# Patient Record
Sex: FEMALE | Race: ASIAN | NOT HISPANIC OR LATINO | Employment: STUDENT | URBAN - METROPOLITAN AREA
[De-identification: names, ages, dates, MRNs, and addresses within clinical notes are randomized per-mention and may not be internally consistent; named-entity substitution may affect disease eponyms.]

---
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
## 2021-05-30 VITALS — BODY MASS INDEX: 24.8 KG/M2 | WEIGHT: 123 LBS | HEIGHT: 59 IN

## 2021-05-30 NOTE — PROGRESS NOTES
13-21 YEAR OLD (TEEN) WELL CHILD VISIT    Subjective:    Sandi Huston is a 19 y.o. female who is here for this well-child visit.  History was provided by the patient and father.    No past surgeries.  Family history updated in chart.  Patient and family moved to [place] from [place] about 2.5 years ago.  I requested old records.  I will review records when available and update chart as appropriate.      Current Outpatient Medications:      hydrocortisone 1 % cream, Apply topically 2 (two) times a day., Disp: 60 g, Rfl: 0     triamcinolone (KENALOG) 0.1 % cream, Apply topically 2 (two) times a day., Disp: , Rfl:   Immunization History   Administered Date(s) Administered     DTaP, historic 2000, 2001, 2000, 09/14/2004, 12/29/2006     HPV Quadrivalent 12/19/2013, 07/09/2014, 11/11/2015     Hep A, historic 12/19/2013, 07/09/2014     Hep B, historic 2001, 2001, 03/08/2002     HiB, historic,unspecified 2001, 2001, 03/08/2002, 07/09/2002, 09/14/2004     IPV 2000, 2001, 2001, 12/29/2006     Influenza, inj, historic,unspecified 10/28/2010, 09/22/2011, 10/30/2012, 12/19/2013, 11/04/2014, 10/20/2015     Influenza, seasonal,quad inj 36+ mos 09/30/2018     Influenza,seasonal quad, PF, 36+MOS 11/15/2017     MMR 07/09/2002, 09/21/2004     Meningococcal MCV4 Conjugate,Unspecified 12/19/2013     Meningococcal MCV4P 11/15/2017     Pneumo Conj 13-V (2010&after) 2001, 2001     Pneumo Conj 7-V(before 2010) 2001, 2001     Tdap 01/07/2013, 04/07/2013     Varicella 09/21/2004, 12/29/2006     Past medical, surgical, family history updated as appropriate.    Current concerns: yes - eczema   Last visit with dermatology was on 1/9/2018.  I reviewed consult note today.  She had achieved pretty good results with triamcinolone 0.1% ointment and.  Plan was to continue with these and then follow-up as needed.  Patient notes that these creams, along with  "regular moisturizers, seem to help pretty well.  We reviewed that she could certainly have flareups off and on for the rest of her life.    Currently menstruating? yes -.  Used to be regular every month.  Then he got to be every 3 months.  Now her periods are more irregular.  She thinks her periods are more regular once they moved to [place].  She notes that she may have gained about 20 pounds stopped exercising.  She is unsure if irregular menses runs in her family.  We discussed possibility of doing hormonal work-up and she would like to do this.    Sexually active? no     Review of Nutrition:  Current diet: normal  diet    Sleep Habits: 9:30 pm to 7 am    Social Screening:  Family Unit: mom, dad, MGM, 7 kids  Parental Relations: normal  Sibling relations: 5 brothers, 1 sister  Parental coping and self-care: doing well; no concerns  Concerns regarding behavior with peers? no  Discipline concerns? no  School: [employer] in Fall , Grade: Freshman  School Concerns: None    Secondhand smoke exposure? no  Known TB Exposure? no    Sports/Exercise:  None, encouraged  Social Activities(recreation, hobbies, TV): working in parents restaurant for summer, sings, plays [employer]  Peer Group (friends, dating, sexual activity) : has friends, not dating  Work/Future Plans: major in music and theater    Screening Questions:  Risk factors for anemia: no  Risk factors for vision problems: yes, has glasses  Risk factors for hearing problems: no  Risk factors for sexually-transmitted infections: no  Risk factors for alcohol/drug use:  no      Hearing Screening    Method: Audiometry    125Hz 250Hz 500Hz 1000Hz 2000Hz 3000Hz 4000Hz 6000Hz 8000Hz   Right ear:   Pass Pass Pass  Pass Pass    Left ear:   Pass Pass Pass  Pass Pass       Visual Acuity Screening    Right eye Left eye Both eyes   Without correction:      With correction: 10/10 10/10    Comments: Plus Lens: Blurry      Objective:   Height:  4' 11\" (1.499 m)  Weight: 133 lb 8 " oz (60.6 kg)  Blood Pressure: 104/66  BMI: Body mass index is 26.96 kg/m .    Growth parameters are noted and are appropriate for age.  General:  Alert  Head:  normocephalic  Eyes: PERRL/EOMI  ENT: Ears normal. TMs normal.  Normal oral pharynx.  Neck:  Normal, no masses  Cardiac: Regular without murmur  Pulmonary: Lungs clear bilaterally  Abdomen:  Soft, no masses or organomegaly noted.  Musculoskeletal:  Normal muscle tone and bulk, normal spine  Skin:  No rashes.  Warm and dry.  Neurologic:  Reflexes normal. Gross motor is normal.  Genitalia:  Declined    Assessment and Plan:   1. Well adolescent and normal sports physical.   Anticipatory guidance discussed.  Gave handout on well-child issues at this age.  -Growth and development appropriate for age.  Foods to avoid, wear seat belt, working smoke detectors, gun storage safety, read books, limit t.v./computer/phone exposure, encourage exercise.  Verbal referral given to dentist.  -Immunizations UTD.  Follow-up visit in 1 year for next well child visit, or sooner as needed.  -Referrals: None.    2.  Eczema.  She is seen dermatology in the past.  Last visit 1/9/2018.  She will continue with triamcinolone 0.1% ointment and desonide 0.05% ointment as directed.  We reviewed the general chronic and flare pattern of eczema.  If she would like to see dermatology again, she will let us know.    3.  Irregular menses.  See HPI for discussion.  Pending labs.  Things normal, continue to monitor.  I also recommended 10 to 15 pound weight loss weight to improve chances of periods becoming more regular.  She has no acute concerns today.  Follow-up as needed.

## 2021-05-31 VITALS — WEIGHT: 127 LBS | BODY MASS INDEX: 25.6 KG/M2 | HEIGHT: 59 IN

## 2021-06-02 ENCOUNTER — OFFICE VISIT - HEALTHEAST (OUTPATIENT)
Dept: FAMILY MEDICINE | Facility: CLINIC | Age: 21
End: 2021-06-02

## 2021-06-02 DIAGNOSIS — Z00.00 ANNUAL PHYSICAL EXAM: ICD-10-CM

## 2021-06-02 ASSESSMENT — MIFFLIN-ST. JEOR: SCORE: 1192.73

## 2021-06-03 VITALS
DIASTOLIC BLOOD PRESSURE: 70 MMHG | RESPIRATION RATE: 16 BRPM | HEIGHT: 59 IN | WEIGHT: 134 LBS | SYSTOLIC BLOOD PRESSURE: 98 MMHG | BODY MASS INDEX: 27.01 KG/M2 | HEART RATE: 82 BPM

## 2021-06-03 VITALS — HEIGHT: 59 IN | WEIGHT: 133.5 LBS | BODY MASS INDEX: 26.91 KG/M2

## 2021-06-03 NOTE — PROGRESS NOTES
Travel Advice Consultation  15 minutes spent, > 50% counseling regarding upcoming travel    Dates of upcoming visit: leaving 11/20 for 11/24 weeks  Countries/ region of visit: [place]  Rural regions visited?  Yes  Activities anticipated?  Visit family family  Any recent out of the country travel?  No      Checklist of needs:  Special considerations with medical history?  No  (Zika warning for women of childbearing age, and men due to sexual transmission potential) discussed this, mosquito avoidance, no chance of pregnancy, no plan soon  Due for any routine immunizations?  No  Travel specific immunizations:  -Typhoid?  Yes    Malaria prophylaxis ?  Yes, Malarone/  Traveler's Diarrhea: Yes/azithromycin    Consultation:  Arthropod -borne disease risk reduction  - Insect Repellant with DEET or picardin  -consider permethrin to clothing/ insect nets with permethrin where malaria  -hand washing for traveler's diarrhea  -cation with water  -carry along loperamide for diarrhea/ when to use antibiotic  -motor vehicle accidents are the the most serious cause of death and major injury  -moving legs on long flights to prevent DVTs  -safe sexual practices- 20% have casual sex- 50% unprotected

## 2021-06-03 NOTE — PATIENT INSTRUCTIONS - HE
Motor vehicle crashes are the #1 killer of healthy [place] citizens traveling to foreign countries.    Always wear seat belts and put children in car seats.    Ride only in marked taxis that have seat belts.    Be alert when crossing the street, especially in countries where people drive on the left.    Don't drink and drive.    Expect cars and trucks to share the road with pedestrians, bicycles, rickshaws, and animals.    Don't ride motorcycles. If you must ride a motorcycle, wear a helmet.    Know local traffic laws before you get behind the wheel.    When possible, avoid riding in a car in a developing country at night.    Avoid overcrowded, overweight, or top-heavy buses or vans.  Mosquitos carry many dangerous infections-including Dengue, Malaria and Zika - Use Insect Repellent  Use EPA-registered insect repellents that contain at least 20% DEET (products include [employer] and [employer] [place]) for protection against mosquitoes, ticks, and other bugs. Other repellents protect against mosquitoes but may not be effective against ticks or other bugs:    Picaridin (also known as KBR 3023, Bayrepel, and icaridin)    Oil of lemon eucalyptus (OLE) or para-menthane-diol (PMD)    [id number]    2-undecanone (methyl nonyl ketone)  Avoid Bugs Where You Are Staying  Choose hotel rooms or other accommodations that are air conditioned or have good window and door screens so bugs can t get inside. If bugs can get into where you are sleeping, sleep under a permethrin-treated bed net that can be tucked under the mattress. rin.  Take preventive medication for malaria if you are in an area that has that illness  Sexually Transmitted infections  Travelers who have casual sex are at risk for sexually transmitted diseases. Prevent STDs when you travel overseas.  An estimated 20% of travelers say that they have had casual sex with a new partner while in a foreign country.1 The excitement of being on vacation may encourage people to do  things they would not do at home, and the inhibition-lowering effects of drugs and alcohol can also contribute to this behavior  Some long-distance travelers are at risk for deep vein thrombosis (DVT) and pulmonary embolism (PE). DVT occurs when a blood clot forms in a large vein. Part of a clot may break off and travel to the lungs, causing a PE, which can be fatal.  Protect yourself by knowing your risk and taking steps to prevent  Blood clots (DVT) while on the airplane  Almost anyone can have DVT. People traveling for extended periods of time may be at increased risk for DVT because they have limited movement. The increased risk is usually associated with air travel, but DVT can also form during travel by bus, train, or car.  Most people who develop travel-associated DVT have additional risk factors, including:    A previous blood clot    Family history of blood clots    Known clotting disorder    Recent surgery or injury    Use of estrogen-containing birth control or hormone replacement therapy    Older age    Obesity    Active cancer (or undergoing chemotherapy)    Limited mobility  You can take steps to help prevent DVT. For long distance travelers, these steps include    Getting up occasionally and walking around.    Exercising your calf muscles and stretching your legs while you're sitting. Try these exercises next time you travel:  o Raising and lowering your heels while keeping your toes on the floor.  o Raising and lowering your toes while keeping your heels on the floor.  o Tightening and releasing your leg muscles.    Selecting an aisle seat when possible.  Animal bites- Risk of Rabies Animals can be cute, and you may want to pet them, but any animal, even if it appears to be friendly or harmless, can be dangerous. Any animal can bite, scratch, kick, or otherwise injure you, even if you did nothing to provoke it. Animals are often frightened of humans or trying to protect their territory or their  young, so stay away from all animals. Some diseases can cause an animal to behave aggressively toward people, even if it had previously been friendly. Never try to pet, handle, or feed unfamiliar animals, even pets. (In other countries, pets may not be vaccinated against rabies and other diseases the way they are in the [place].)    The main threat from dogs and cats is rabies. Rabies is spread in the saliva of an infected animal, so the only way to prevent it (other than vaccination) is to avoid being bitten, scratched, or licked by any animal. Although any mammal can get rabies, dogs are responsible for most rabies deaths.  If you are bitten, immediately wash the wound with plenty of soap and water, and see a doctor as soon as possible. Rabies is almost always fatal if an exposed person is not promptly given rabies shots. In some countries, the care needed after a bite may not be available. Medical evacuation insurance would pay to fly you to a country where you can get the best care.  Rabies vaccination is recommended for certain travelers:    People staying a long time in a high-risk area.    People involved in outdoor activities, such as camping or caving, where they might come into contact with animals.    People whose jobs will put them at risk (such as veterinarians or wildlife personnel).    Some children (they tend to play with animals and so are at higher risk).    If you or your children fall into any of these categories, ask your doctor about rabies vaccination. Even if you have been vaccinated against rabies, you must get rabies shots as soon as possible if you are exposed to an animal that might have rabies; having vaccine before traveling will simplify your post-exposure management and may give you more time to seek care. Being vaccinated only buys you more time to get treatment after an exposure.  Diarrhea  Choose foods and beverages carefully to lower your risk of diarrhea Eat only food that  is cooked and served hot. (Avoid, for example, food that has been sitting on a buffet.) Eat raw fruits and vegetables only if you have washed them in clean water or peeled them. Drink only beverages from factory-sealed containers, and avoid ice (because it may have been made from unclean water).    Wash your hands often with soap and water, especially after using the bathroom and before eating. If soap and water aren t available, use an alcohol-based hand . In general, it s a good idea to keep your hands away from your mouth.  People with diarrhea should drink lots of fluids to stay hydrated. This is especially important for young children or adults with chronic illnesses. In serious cases of travelers  diarrhea, oral rehydration solution--available online or in pharmacies in developing countries--can be used for fluid replacement.  Many travelers carry antibiotics with them so they can treat diarrhea early if they start to get sick.  (Ciprofloxacin or Azithromycin; the choice of antibiotics varies depending on the destination. Use this only for severe cases of diarrhea, when you are having more than 4 or 5 episodes per day.  I  Several drugs, such as Lomotil or Imodium, can be bought over-the-counter to treat the symptoms of diarrhea. These drugs decrease the frequency and urgency of needing to use the bathroom, and they may make it easier for a person with diarrhea to ride on a bus or airplane while waiting for an antibiotic to take effect.  People who are traveling to a foreign country to visit friends or relatives (called  VFR travelers  by some professionals) are at higher risk for some diseases. The risk is higher because VFR travelers generally stay longer than tourists, eat local food in people s homes, and may not take the same precautions (such as preventing bug bites) as tourists do. VFR travelers often do not see a doctor for vaccines and advice before they travel, possibly because of cost,  cultural or language barriers, or limited time. If you are planning to travel overseas to visit friends or relatives, consider your increased risk of illnesses and plan accordingly.

## 2021-06-10 NOTE — PROGRESS NOTES
New pt  Rash as skin worse and not responding to usual tmc cream from 2016.    Also something around mouth.  A non prescription.    hosp surg resp issue as 10 month old.  fx left clavicle.  Fmh: dad kidney stone.  Gf gout.  Sister with holes in heart.  Had surgery.  meds tmc cream  Allergies neg  hab neg neg  Fhsh: 10th grade.  Doing all right.   [facility]  [facility].   Lives with family     Five brothers and one sister.  Moved from [facility]    ROS:  Constitutional: denies fever  Vision: denies change in vision  ENT: denies cough  Resp: denies shortness of breath  Card: denies palpitations    No problems with exercise    GI: denies vomiting or abnormal stool, melena, hematochezia  : denies dysuria  Neuro: denies numbness or weakness  Derm: above rash  Joints: denies redness or swelling  Endo: denies polyuria  Mental health: mood is good  Extremities: no edema  Otherwise negative review of systems    ROS: as noted above    OBJECTIVE:   Vitals:    04/28/17 1333   BP: 100/64   Pulse: 84   Resp: 20   Temp: 98.3  F (36.8  C)      Head: atraumatic   Eyes: nl eom, anicteric   Ears: nl external ears   Neck: nl nodes, supple   Lungs: clear to ausc   Heart: regular rhythm  Back: no tenderness  Abd: soft nontender   Joints: uninflamed   Ext: nontender calves   Mental: euthymic  Neuro: no weakness  Gait: normal    Post inflammatory hyperpigmented areas trunk and upper ext  Newer inflamed lesions and excoriated on upper ext. No areas of induration or weeping or abnl nodes  ASSESSMENT/PLAN:    1. Eczema, unspecified type  sulfamethoxazole-trimethoprim (SEPTRA DS) 800-160 mg per tablet    hydrocortisone 1 % cream     Chronic eczema ? Infected and less responsive./ septra ds and continue tmc cream and hc cream. Discussed principles of use.   Expect return to baseline otherwise return

## 2021-06-14 NOTE — PROGRESS NOTES
Still problems with eczema.  Years of creams and flares and many questions.    New issue with servando oral scaliness and hyperpigmentation  Wishes dermatology visit    ROS: as noted above    OBJECTIVE:   Vitals:    11/15/17 1442   BP: (!) 82/60   Pulse: 76   Resp: 20      Eyes: non icteric, noninflamed  Lungs: no resp distress  Heart: regular  Ankles: no edema  Muscles: nontender  Mental status: euthymic  Neuro: nonfocal  Angular stomatitis with post inflammatory pigment  Acne  Mild eczema  ASSESSMENT/PLAN:    1. Eczema  Ambulatory referral to Dermatology   2. Stomatitis  Ambulatory referral to Dermatology   3. Immunization due  Meningococcal MCV4P   4. Acne  Ambulatory referral to Dermatology     Education on likely treatments

## 2021-06-16 PROBLEM — L30.9 ECZEMA: Status: ACTIVE | Noted: 2019-07-08

## 2021-06-16 PROBLEM — N92.6 IRREGULAR MENSES: Status: ACTIVE | Noted: 2019-07-08

## 2021-06-17 NOTE — PATIENT INSTRUCTIONS - HE
Patient Instructions by Latricia Krishnan PA-C at 7/8/2019  3:00 PM     Author: Latricia Krishnan PA-C Service: -- Author Type: Physician Assistant    Filed: 7/8/2019  4:12 PM Encounter Date: 7/8/2019 Status: Signed    : Latricia Krishnan PA-C (Physician Assistant)         Patient Education             [facility] Patient Handout   18 to 21 Year Visits     Your Daily Life    Visit the dentist at least twice a year.    Protect your hearing at work, home, and concerts.    Eat a variety of healthy foods.    Eat breakfast every morning.    Drink plenty of water.    Make sure to get enough calcium.    Have 3 or more servings of low-fat (1%) or fat-free milk and other low-fat dairy products each day.    Aim for 1 hour of vigorous physical activity.    Be proud of yourself when you do something well.  Healthy Behavior Choices    Support friends who choose not to use drugs, alcohol, tobacco, steroids, or diet pills.    If you use drugs or alcohol, you can talk to us about it. We can help you with quitting or cutting down on your use.    Make healthy decisions about your sexual behavior.    If you are sexually active, always practice safe sex. Always use a condom to prevent STIs.    All sexual activity should be something you want. No one should ever force or try to convince you.    Find safe activities at school and in the community. Violence and Injuries    Do not drink and drive or ride in a vehicle with someone who has been using drugs or alcohol.    If you feel unsafe driving or riding with someone, call someone you trust to drive you.    Always wear a seat belt in the car.    Know the rules for safe driving.    Never allow physical harm of yourself or others at home or school.    Always deal with conflict using nonviolence.    Remember that healthy dating relationships are built on respect and that saying no is OK.    Fighting and carrying weapons can be dangerous.  Your Feelings    Figure out  healthy ways to deal with stress.    Try your best to solve problems and make decisions on your own.    Most people have daily ups and downs. But if you are feeling sad, depressed, nervous, irritable, hopeless, or angry, talk with me or another health professional.    We understand sexuality is an important part of your development. If you have any questions or concerns, we are here for you. School and Friends    Take responsibility for being organized enough to succeed in work or school.    Find new activities you enjoy.    Consider volunteering and helping others in the community on an issue that interests or concerns you.    Form healthy friendships and find fun, safe things to do with friends.    As you get older, making and keeping friends is important. You may find that you drift away from some of your old friends--thats normal.    Evaluate your friendships and keep those that are healthy.    It is still important to stay connected with your family.

## 2021-06-19 NOTE — LETTER
Letter by Latricia Krishnan PA-C at      Author: Latricia Krishnan PA-C Service: -- Author Type: --    Filed:  Encounter Date: 7/9/2019 Status: (Other)         formerly [facility] Nhia Robel  [address] [address]             July 9, 2019         Dear Ms. Huston,    Below are the results from your recent visit:    Resulted Orders   Thyroid Stimulating Hormone (TSH)   Result Value Ref Range    TSH 1.98 0.30 - 5.00 uIU/mL   Follicle Stimulating Hormone (FSH)   Result Value Ref Range    FSH 9.0 mIU/mL      Comment:        Females:     Prepubertal     0-10 mIU/mL    Follicular      3-20 mIU/mL    Luteal          0-12 mIU/mL    Ovulatory       9-26 mIU/mL    Postmenopausal   mIU/mL   Prolactin   Result Value Ref Range    Prolactin 10.8 0.0 - 20.0 ng/mL       All of your hormone levels are normal, so they are not affecting your periods.    Please call with questions or contact us using AutoAlertt.    Sincerely,        Electronically signed by Latricia Krishnan PA-C

## 2021-06-25 NOTE — PROGRESS NOTES
FEMALE PREVENTATIVE EXAM    Assessment and Plan:   1. Annual physical exam  Health maintenance discussed with patient as appropriate for age and risk factors.  Encouraged increased exercise and healthy eating.  Verbal referral given for dentist.  Not yet due for pap, follow-up in 1 year.  She declines offer of birth control.  She declines STD screening.  - Pediatric Symptom Checklist      Subjective:   Chief Complaint: Sandi Huston is an 21 y.o. female here for a preventative health visit.    Patient has been advised of split billing requirements and indicates understanding: Yes  HPI:    No concerns today.  Got a message in the mail that she was due for a physical.    Patient was going to [facility].  Is now going to [facility] for cosmetology.    Not using anything for birth control.  All forms of birth control were discussed with patient today.  Printed information given to her on birth control.  Teen screen questionnaire completed today and is negative.    Healthy Habits  Are you taking a daily aspirin? No  Do you typically exercising at least 40 min, 3-4 times per week?  NO  Do you usually eat at least 4 servings of fruit and vegetables a day, include whole grains and fiber and avoid regularly eating high fat foods? NO  Have you had an eye exam in the past two years? Yes  Do you see a dentist twice per year? maybe  Do you have any concerns regarding sleep? No    Safety Screen  If you own firearms, are they secured in a locked gun cabinet or with trigger locks? The patient does not own any firearms  Do you feel you are safe where you are living?: Yes (6/2/2021 10:16 AM)  Do you feel you are safe in your relationship(s)?: Yes (6/2/2021 10:16 AM)      Review of Systems:    Complete review of systems discussed with patient and is negative except as noted in HPI.    Cancer Screening     Patient has no health maintenance due at this time          History     Not marked as reviewed during this visit.        Family  history reviewed and updated today.    Objective:   Vital Signs: There were no vitals taken for this visit.       PHYSICAL EXAM  Vital signs reviewed  General:  Patient is alert and oriented ×3, in no apparent distress  Head:  Normocephalic, without obvious abnormality, atraumatic  Eyes:  PERRLA laterally, conjunctiva clear, EOMs intact bilaterally  Ears:  Normal tympanic membranes and external ear canals  Neck:  No adenopathy, normal ROM  Cardiac:  Regular rate and rhythm, normal S1 and S2, no murmur, rub, or gallop  Lungs:  Clear to auscultation bilaterally, no crackles, rales, rhonchi, or wheezing noted, respirations unlabored  Abdomen:  Soft, nontender, normal bowel sounds, no masses, no organomegaly  Musculoskeletal:  Extremities normal, atraumatic, normal range of motion of all extremities, normal range of motion of back, patient walks a normal tandem gait  Skin:  Skin color, texture, turgor, normal, no rashes or lesions noted on exposed skin  Lymph nodes:  No cervical lymph nodes normal bilaterally  Neurologic:  Cranial nerves II through XII grossly intact      Additional Screenings Completed Today:   None

## 2021-07-06 VITALS
BODY MASS INDEX: 22.98 KG/M2 | DIASTOLIC BLOOD PRESSURE: 72 MMHG | WEIGHT: 114 LBS | OXYGEN SATURATION: 98 % | SYSTOLIC BLOOD PRESSURE: 96 MMHG | HEART RATE: 83 BPM | HEIGHT: 59 IN

## 2021-10-11 ENCOUNTER — HEALTH MAINTENANCE LETTER (OUTPATIENT)
Age: 21
End: 2021-10-11

## 2022-07-17 ENCOUNTER — HEALTH MAINTENANCE LETTER (OUTPATIENT)
Age: 22
End: 2022-07-17

## 2022-09-24 ENCOUNTER — HEALTH MAINTENANCE LETTER (OUTPATIENT)
Age: 22
End: 2022-09-24

## 2023-04-17 ENCOUNTER — OFFICE VISIT (OUTPATIENT)
Dept: FAMILY MEDICINE | Facility: CLINIC | Age: 23
End: 2023-04-17
Payer: COMMERCIAL

## 2023-04-17 ENCOUNTER — ANCILLARY PROCEDURE (OUTPATIENT)
Dept: GENERAL RADIOLOGY | Facility: CLINIC | Age: 23
End: 2023-04-17
Payer: COMMERCIAL

## 2023-04-17 VITALS
SYSTOLIC BLOOD PRESSURE: 117 MMHG | WEIGHT: 127 LBS | BODY MASS INDEX: 24.94 KG/M2 | HEART RATE: 76 BPM | HEIGHT: 60 IN | OXYGEN SATURATION: 99 % | DIASTOLIC BLOOD PRESSURE: 80 MMHG

## 2023-04-17 DIAGNOSIS — Z11.1 SCREENING EXAMINATION FOR PULMONARY TUBERCULOSIS: ICD-10-CM

## 2023-04-17 DIAGNOSIS — Z13.0 SCREENING FOR DEFICIENCY ANEMIA: ICD-10-CM

## 2023-04-17 DIAGNOSIS — Z00.00 ROUTINE GENERAL MEDICAL EXAMINATION AT A HEALTH CARE FACILITY: Primary | ICD-10-CM

## 2023-04-17 DIAGNOSIS — Z71.84 ENCOUNTER FOR COUNSELING FOR TRAVEL: ICD-10-CM

## 2023-04-17 DIAGNOSIS — Z12.4 CERVICAL CANCER SCREENING: ICD-10-CM

## 2023-04-17 DIAGNOSIS — Z11.3 SCREENING FOR STDS (SEXUALLY TRANSMITTED DISEASES): ICD-10-CM

## 2023-04-17 DIAGNOSIS — Z11.4 SCREENING FOR HIV (HUMAN IMMUNODEFICIENCY VIRUS): ICD-10-CM

## 2023-04-17 DIAGNOSIS — T61.91XA ALLERGIC REACTION TO FISH: ICD-10-CM

## 2023-04-17 DIAGNOSIS — Z11.59 NEED FOR HEPATITIS C SCREENING TEST: ICD-10-CM

## 2023-04-17 LAB
ALBUMIN UR-MCNC: NEGATIVE MG/DL
APPEARANCE UR: CLEAR
BILIRUB UR QL STRIP: NEGATIVE
COLOR UR AUTO: YELLOW
CREAT UR-MCNC: 111 MG/DL
GLUCOSE UR STRIP-MCNC: NEGATIVE MG/DL
HGB BLD-MCNC: 13.6 G/DL (ref 11.7–15.7)
HGB UR QL STRIP: NEGATIVE
KETONES UR STRIP-MCNC: NEGATIVE MG/DL
LEUKOCYTE ESTERASE UR QL STRIP: NEGATIVE
MICROALBUMIN UR-MCNC: <12 MG/L
MICROALBUMIN/CREAT UR: NORMAL MG/G{CREAT}
NITRATE UR QL: NEGATIVE
PH UR STRIP: 6.5 [PH] (ref 5–8)
SP GR UR STRIP: 1.02 (ref 1–1.03)
UROBILINOGEN UR STRIP-ACNC: 0.2 E.U./DL

## 2023-04-17 PROCEDURE — 81003 URINALYSIS AUTO W/O SCOPE: CPT

## 2023-04-17 PROCEDURE — 86580 TB INTRADERMAL TEST: CPT

## 2023-04-17 PROCEDURE — 90471 IMMUNIZATION ADMIN: CPT

## 2023-04-17 PROCEDURE — 82570 ASSAY OF URINE CREATININE: CPT

## 2023-04-17 PROCEDURE — 99213 OFFICE O/P EST LOW 20 MIN: CPT | Mod: 25

## 2023-04-17 PROCEDURE — G0145 SCR C/V CYTO,THINLAYER,RESCR: HCPCS

## 2023-04-17 PROCEDURE — 99395 PREV VISIT EST AGE 18-39: CPT | Mod: 25

## 2023-04-17 PROCEDURE — 71046 X-RAY EXAM CHEST 2 VIEWS: CPT | Mod: TC | Performed by: RADIOLOGY

## 2023-04-17 PROCEDURE — 86003 ALLG SPEC IGE CRUDE XTRC EA: CPT

## 2023-04-17 PROCEDURE — 92551 PURE TONE HEARING TEST AIR: CPT

## 2023-04-17 PROCEDURE — 86803 HEPATITIS C AB TEST: CPT

## 2023-04-17 PROCEDURE — 87491 CHLMYD TRACH DNA AMP PROBE: CPT

## 2023-04-17 PROCEDURE — 36415 COLL VENOUS BLD VENIPUNCTURE: CPT

## 2023-04-17 PROCEDURE — 85018 HEMOGLOBIN: CPT

## 2023-04-17 PROCEDURE — 82043 UR ALBUMIN QUANTITATIVE: CPT

## 2023-04-17 PROCEDURE — 87389 HIV-1 AG W/HIV-1&-2 AB AG IA: CPT

## 2023-04-17 PROCEDURE — 90715 TDAP VACCINE 7 YRS/> IM: CPT

## 2023-04-17 PROCEDURE — 87591 N.GONORRHOEAE DNA AMP PROB: CPT

## 2023-04-17 ASSESSMENT — ENCOUNTER SYMPTOMS
EYE PAIN: 0
HEARTBURN: 0
MYALGIAS: 0
DYSURIA: 0
CHILLS: 0
DIARRHEA: 0
FEVER: 0
DIZZINESS: 0
JOINT SWELLING: 0
HEMATOCHEZIA: 0
NERVOUS/ANXIOUS: 0
COUGH: 0
ARTHRALGIAS: 0
HEADACHES: 0
NAUSEA: 0
HEMATURIA: 0
SORE THROAT: 0
CONSTIPATION: 0
ABDOMINAL PAIN: 0
FREQUENCY: 0
PALPITATIONS: 0
BREAST MASS: 0
PARESTHESIAS: 0
WEAKNESS: 0
SHORTNESS OF BREATH: 0

## 2023-04-17 NOTE — ASSESSMENT & PLAN NOTE
Reviewed the documentation the patient provided for upcoming study abroad program.  Hearing and vision was completed today per this request.  Chest x-ray was obtained.  PPD was applied today, and patient will return to clinic in 3 days for reevaluation.  She will also complete a UA at that time, as we did routine STI screening today.  Hemoglobin was normal.  Updated patient's tetanus vaccine today; she is up-to-date on all other recommendations.  I did encourage her to schedule a travel visit to a couple of weeks prior to her leaving the country, she discussed recommended vaccinations or other precautions specific travel to [place].

## 2023-04-17 NOTE — ASSESSMENT & PLAN NOTE
Annual examination.  Patient is at average risk of breast and colon cancer, so screening has not been initiated yet.  Initial Pap was done today after thorough explanation of procedure and monitoring guidelines.  HIV and hepatitis C routine screening labs were completed today.  Additionally, gonorrhea and chlamydia screenings were completed today as is recommended; patient denies any current concerns.  Obtained variety of diagnostics related to patient's upcoming travel to [place], documented elsewhere.  Discussed diet and exercise habits; encourage patient to begin to incorporate regular cardiovascular exercise into her days.  Discussed bone health, including adequate calcium and vitamin D intake with weightbearing exercise.  Follow-up in 1 year or sooner with any acute concerns.

## 2023-04-17 NOTE — ASSESSMENT & PLAN NOTE
Discussed with patient that as she is traveling to [facility] in a couple of months and that she has had new emerging food allergens, it would likely be beneficial to obtain an evaluation.  Offered an allergist referral versus basic allergen panel today in clinic.  Patient has elected to proceed with blood work today, and will refer to allergy if needed.

## 2023-04-18 LAB
C TRACH DNA SPEC QL NAA+PROBE: NEGATIVE
HCV AB SERPL QL IA: NONREACTIVE
HIV 1+2 AB+HIV1 P24 AG SERPL QL IA: NONREACTIVE
N GONORRHOEA DNA SPEC QL NAA+PROBE: NEGATIVE

## 2023-04-19 LAB
ALMOND IGE QN: <0.1 KU(A)/L
BKR LAB AP GYN ADEQUACY: NORMAL
BKR LAB AP GYN INTERPRETATION: NORMAL
BKR LAB AP HPV REFLEX: NO
BKR LAB AP PREVIOUS ABNORMAL: NORMAL
CASHEW NUT IGE QN: <0.1 KU(A)/L
CODFISH IGE QN: 9.39 KU(A)/L
COW MILK IGE QN: 0.3 KU(A)/L
EGG WHITE IGE QN: <0.1 KU(A)/L
HAZELNUT IGE QN: <0.1 KU(A)/L
IGE SERPL-ACNC: 250 KU/L (ref 0–114)
PATH REPORT.COMMENTS IMP SPEC: NORMAL
PATH REPORT.COMMENTS IMP SPEC: NORMAL
PATH REPORT.RELEVANT HX SPEC: NORMAL
PEANUT IGE QN: <0.1 KU(A)/L
SALMON IGE QN: 6.93 KU(A)/L
SCALLOP IGE QN: <0.1 KU(A)/L
SESAME SEED IGE QN: <0.1 KU(A)/L
SHRIMP IGE QN: <0.1 KU(A)/L
SOYBEAN IGE QN: <0.1 KU(A)/L
TUNA IGE QN: 3.55 KU(A)/L
WALNUT IGE QN: <0.1 KU(A)/L
WHEAT IGE QN: <0.1 KU(A)/L

## 2023-04-20 ENCOUNTER — ALLIED HEALTH/NURSE VISIT (OUTPATIENT)
Dept: FAMILY MEDICINE | Facility: CLINIC | Age: 23
End: 2023-04-20
Payer: COMMERCIAL

## 2023-04-20 DIAGNOSIS — Z11.1 ENCOUNTER FOR PPD SKIN TEST READING: Primary | ICD-10-CM

## 2023-04-20 PROCEDURE — 99207 PR NO CHARGE NURSE ONLY: CPT

## 2023-04-20 NOTE — PROGRESS NOTES
Patient is here today for a Mantoux (TST) test results.    Did patient return to clinic 48-72 hours from Mantoux (TST) placement: Yes -     Induration Size? Induration <5mm - Enter results in Enter/Edit Activity. Route results to ordering provider.     Patient needs form signed? Yes. Follow clinic form process.     Patient reports having previously had the BCG Vaccine: No    Does patient need a two step? No

## 2023-06-28 ENCOUNTER — OFFICE VISIT (OUTPATIENT)
Dept: FAMILY MEDICINE | Facility: CLINIC | Age: 23
End: 2023-06-28
Payer: COMMERCIAL

## 2023-06-28 VITALS
DIASTOLIC BLOOD PRESSURE: 70 MMHG | SYSTOLIC BLOOD PRESSURE: 100 MMHG | BODY MASS INDEX: 26.39 KG/M2 | HEART RATE: 90 BPM | TEMPERATURE: 99.1 F | WEIGHT: 134.4 LBS | OXYGEN SATURATION: 97 % | RESPIRATION RATE: 16 BRPM | HEIGHT: 60 IN

## 2023-06-28 DIAGNOSIS — Z71.84 TRAVEL ADVICE ENCOUNTER: Primary | ICD-10-CM

## 2023-06-28 PROCEDURE — 91312 COVID-19 BIVALENT 12+ (PFIZER): CPT | Performed by: NURSE PRACTITIONER

## 2023-06-28 PROCEDURE — 0121A COVID-19 BIVALENT 12+ (PFIZER): CPT | Performed by: NURSE PRACTITIONER

## 2023-06-28 PROCEDURE — 99402 PREV MED CNSL INDIV APPRX 30: CPT | Mod: 25 | Performed by: NURSE PRACTITIONER

## 2023-06-28 ASSESSMENT — PAIN SCALES - GENERAL: PAINLEVEL: NO PAIN (0)

## 2023-06-28 NOTE — PATIENT INSTRUCTIONS
Thank you for visiting the [facility] : [phone]  Today June 28, 2023 you received the    Covid 19 booster bivalent    Follow up vaccine appointments can be made as a NURSE ONLY visit at the Travel Clinic, (BE PREPARED TO WAIT, ) or at designated [facility].    If you are receiving the Rabies vaccines series, it is important that you follow the exact schedule ordered.     Pre-travel     We recommend that you purchase Medical Evacuation Insurance prior to your departure.  Https://wwwnc.cdc.gov/travel/page/insurance    [place] your travel plans with the  Department of [place] through STEP ( Smart Traveler Enrollment Program ) https://step.state.gov.  STEP is a free service to allow U[place] citizens and nationals traveling and living abroad to enroll their trip with the nearest U.S. Embassy or Consulate.    Animal Exposure: Avoid all mammals even if they look healthy.  If there is a bite, scratch or even a lick, wash area immediately with soap and water for 15 minutes and seek medical care within 24 hours for evaluation of Rabies post exposure treatment.  Contact your Medical Evacuation Insurance.    Repiratory illness prevention strategies ( Covid and Influenza ) CDC recommendations:  Consider wearing a mask in crowded or poorly ventilated indoor areas, including on public transportation and in transportation hubs.  Hand washing: frequent, thorough handwashing with soap and water for 20 seconds. Use an alcohol-based hand  with at least 60% alcohol if soap and water are not readily available. Avoid touching face, nose, eyes, mouth unless you have done appropriate hand washing as above.  VACCINES: Staying up to date on COVID-19 vaccines significantly lowers the risk of getting very sick, being hospitalized, or dying from COVID-19. CDC recommends that everyone stay up to date on their COVID-19 vaccines, especially people with weakened immune systems.    Travel  Covid 19 Testing:  updated 12/06/2021  International travelers: Pre-travel:  See country specific Embassy websites or airline websites.    Post travel: CDC recommends getting tested 3-5 days after your trip     Post-travel illness:  Contact your provider or [facility] if you develop a fever, rash, cough, diarrhea or other symptoms for up to 1 year after travel.  Inform your healthcare provider when and where you traveled to.    Please call the [facility] with any questions [phone]  Or send your provider a 'My Chart' note.

## 2023-06-28 NOTE — PROGRESS NOTES
"Nurse Note ( Pre-Travel Consult)      Itinerary:  [facility]      Departure Date: 08/20      Return Date: 12/22      Length of Trip 4 months      Reason for Travel: Study abroad           Urban or rural: both      Accommodations: Student Housing        IMMUNIZATION HISTORY  Have you received any immunizations within the past 4 weeks?  No  Have you ever fainted from having your blood drawn or from an injection?  No  Have you ever had a fever reaction to vaccination?  No  Have you ever had any bad reaction or side effect from any vaccination?  No  Have you ever had hepatitis A or B vaccine?  Yes  Do you live (or work closely) with anyone who has AIDS, an AIDS-like condition, any other immune disorder or who is on chemotherapy for cancer?  No  Do you have a family history of immunodeficiency?  No  Have you received any injection of immune globulin or any blood products during the past 12 months?  No    Patient roomed by Zeke Junior Phuong Huston is a 23 year old female seen today alone for counsultation for international travel.   Patient will be departing in  2 month(s) and  traveling alone  with school group.      Patient itinerary :  will be in the urban and periurban region of [facility]  which risk for [place] Encephalitis. exposure.      Patient's activities will include study abroad.    Patient's country of birth is [place]    Special medical concerns: none  Pre-travel questionnaire was completed by patient and reviewed by provider.     Vitals: /70   Pulse 90   Temp 99.1  F (37.3  C) (Temporal)   Resp 16   Ht 1.519 m (4' 11.8\")   Wt 61 kg (134 lb 6.4 oz)   LMP 06/03/2023 (Exact Date)   SpO2 97%   BMI 26.42 kg/m    BMI= Body mass index is 26.42 kg/m .    EXAM:  General:  Well-nourished, well-developed in no acute distress.  Appears to be stated age, interacts appropriately and expresses understanding of information given to patient.    Current Outpatient Medications   Medication Sig Dispense Refill     " triamcinolone (KENALOG) 0.1 % cream [TRIAMCINOLONE (KENALOG) 0.1 % CREAM] Apply topically 2 (two) times a day. (Patient not taking: Reported on 6/28/2023)       Patient Active Problem List   Diagnosis     Eczema     Irregular menses     Routine general medical examination at a health care facility     Allergic reaction to fish     Encounter for counseling for travel     Allergies   Allergen Reactions     Fish-Derived Products Other (See Comments)     Numbness     Chicken Allergy Other (See Comments)     Numbness         Immunizations discussed include:   Covid 19: Ordered/given today, risks, benefits and side effects reviewed  Hepatitis A:  Up to date  Hepatitis B: Up to date  Influenza: Up to date  Typhoid: Not indicated  Rabies: Not indicated  Yellow Fever: Not indicated  Maori Encephalitis: ALLERGIC TO FISH, has been tested confirmed   Meningococcus: Up to date  Tetanus/Diphtheria: Up to date  Measles/Mumps/Rubella: Up to date  Cholera: Not needed  Polio: Up to date  Pneumococcal: Up to date  Varicella: Up to date  Shingrix: Not indicated  HPV:  Up to date     TB: low risk     Altitude Exposure on this trip: no  Past tolerance to Altitude: na    ASSESSMENT/PLAN:  Sandi Baca was seen today for travel clinic.    Diagnoses and all orders for this visit:    Travel advice encounter  -     COVID-19 BIVALENT 12+ (PFIZER)      I have reviewed general recommendations for safe travel   including: food/water precautions, insect precautions, safer sex   practices given high prevalence of Zika, HIV and other STDs,   roadway safety. Educational materials and [employer] report provided.    Malaraia prophylaxis recommended: none  Symptomatic treatment for traveler's diarrhea: azithromycin        Evacuation insurance advised and resources were provided to patient.    Total visit time 30 minutes  with over 50% of time spent counseling patient and shared decision making as detailed above.    Paz Kent CNP  Certificate in Travel  Health

## 2023-07-28 ENCOUNTER — OFFICE VISIT (OUTPATIENT)
Dept: DERMATOLOGY | Facility: CLINIC | Age: 23
End: 2023-07-28
Payer: COMMERCIAL

## 2023-07-28 DIAGNOSIS — L30.9 ECZEMA, UNSPECIFIED TYPE: ICD-10-CM

## 2023-07-28 DIAGNOSIS — L30.9 DERMATITIS OF LIP: Primary | ICD-10-CM

## 2023-07-28 PROCEDURE — 99204 OFFICE O/P NEW MOD 45 MIN: CPT | Performed by: NURSE PRACTITIONER

## 2023-07-28 RX ORDER — TRIAMCINOLONE ACETONIDE 1 MG/G
OINTMENT TOPICAL 2 TIMES DAILY
Qty: 80 G | Refills: 1 | Status: SHIPPED | OUTPATIENT
Start: 2023-07-28

## 2023-07-28 RX ORDER — HYDROCORTISONE 25 MG/G
OINTMENT TOPICAL 2 TIMES DAILY
Qty: 40 G | Refills: 1 | Status: SHIPPED | OUTPATIENT
Start: 2023-07-28

## 2023-07-28 NOTE — LETTER
7/28/2023         RE: Sandi Huston  [address] [facility] [address]        Dear Colleague,    Thank you for referring your patient, Sandi Huston, to the [facility]. Please see a copy of my visit note below.    [facility] Dermatology Note  Encounter Date: Jul 28, 2023  Office Visit     Reviewed patients past medical history and pertinent chart review prior to patients visit today.     Dermatology Problem List:  Eczematous dermatitis versus irritant/contact dermatitis    ____________________________________________    Assessment & Plan:     # Eczematous dermatitis versus irritant/contact dermatitis  -For the facial dermatitis she will start using hydrocortisone 2.5% ointment twice daily.  She will discontinue all facial products and will wash the face with water only, she will plan to use only Aquaphor ointment or Vaseline as moisturizers for the lips.  I did discuss use of Vanicream moisturizing cream as her facial moisturizer.  I would like her to refrain from using any other products and creams on the hands or face to minimize reaction potential.  -For flares on the hands of the body start triamcinolone 0.1% ointment twice daily for 2-4 weeks, decreasing as patients rash improves, repeat cycle for flares. If not fully resolved in 1 month, patient advised to return to clinic for reevaluation. Discussed risk of skin atrophy with long term chronic use. Not for face, armpits or groin.   -When bathing, use gentle soap such as Dove for Sensitive Skin and lukewarm water on amrpits, groin, and other visibly dirty areas, all other areas let the water run over but no soap is necessary. Pat skin dry instead of vigorous rubbing. Encouraged regular use of an emollient such as Vanicream, Cera Ve Cream or Cetaphil Cream to the entire body.     Follow-up in 3 to 4 weeks, before she leaves to study abroad in [place], for recheck    Faina Yañez, CNP  Dermatology    _______________________________________    CC: Rash (Rash around mouth and palms of hand/finger, neck. Flares at times and today looks better. Had since childhood ( in last year having lots of flares srinivas with warm weather). 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 Sandi Huston is a(n) 23 year old female who presents today as a new patient for rash on the lips and surrounding skin, hands and arms.  She has had eczema since she was a child but mostly in the creases of the elbows knees and face.  She seemed to grow out of this but about 5 years ago she started to get some rashes on her arms and hands and about a year ago she started getting more rash around her lips.  It is itchy red and flaky.  She has been using several over-the-counter facial products and lip products.  Currently she is mostly using A&E ointment, Aquaphor lip balm, and Vaseline but she does use other products on her face such as toners cleansers and moisturizers.    Patient is otherwise feeling well, without additional skin concerns.      Physical Exam:  Vitals: [facility] 06/03/2023 (Exact Date)   SKIN: Focused examination of hands, arms, face, neck was performed.  -Bilateral lips and surrounding skin especially on the upper cutaneous lip is consistent erythema and fine scaling  -A few patches of erythema and scale on the left palm and scattered on bilateral arms as well as some hyperpigmented areas from well-healed lesions    - No other lesions of concern on areas examined.     Medications:  No current outpatient medications on file.     No current facility-administered medications for this visit.      Past Medical History:   Patient Active Problem List   Diagnosis     Eczema     Irregular menses     Routine general medical examination at a health care facility     Allergic reaction to fish      Encounter for counseling for travel     No past medical history on file.    CC Referred Self, MD  No address on file on close of this encounter.       Again, thank you for allowing me to participate in the care of your patient.        Sincerely,        GURMEET Bro CNP

## 2023-07-28 NOTE — PATIENT INSTRUCTIONS
Eczema Action Plan - start by using only vanicream, aquaphor or vaseline products to make sure you are not allergic to something you are putting on your skin.     Name: Sandi Huston Provider: ERMIAS LEE Date: 7/28/2023    Step 1: Eczema Under Control (Skin is soft and flexible, not red or itchy)  Moisturize the whole body at least two times a day.  Watch for signs that the skin is turning red, itchy, or dry.  Use a cream in a jar from the list below. Do not use lotions from a pump.  Suggested creams:  CeraVe Cream, Cetaphil Cream, Vanicream, Aquaphor, Vaseline  Use a gentle cleanser/soap in the bath/shower such as dove sensitive cleanser or Cetaphil cleanser only to the armpits and groin areas, or where you are visibly dirty. All other areas should get washed with water only. Do not scrub. After bathing pat the skin dry with a towel instead of rubbing dry. Apply your moisturizer while you're still slightly damp to lock in some of the moisture.     Step 2:  Eczema Flare (Eczema is out of control and not responding to maintenance care)       Continue above procedures  Also, use prescribed steroid ointment two times a day for up to two weeks per month. Apply to red and itchy areas. Put the steroid on the skin first before other creams.   Face:hydrocortisone 2.5% ointment  Body:triamcinolone 0.1% ointment     Use one fingertip unit of the ointment for eczema. A fingertip unit is the amount of ointment from the first bend in finger to the fingertip. This amount will cover an area equal to two adult hands. Using steroids for extended periods of time can thin the skin and cause stretch marks. Never use for longer than 3-4 weeks before following up with your provider. Please follow up in clinic if rash persists and does not resolve with this regimen in 1 months time.     Moisturize your whole body two times a day with a suggested cream.    Wet Wraps  If your rash is very itchy or getting out of control you can also  "try applying what we call \"wet wraps\". Use your moisturizers/medications where instructed and THEN cover that body area with a wet cotton clothing or cloth and put a second dry layer over the top. You can do this for a short time or even overnight. For example: if your hands are the problem area, try wet cotton gloves or socks and cover with a dry pair. Or if your legs are affected, wet a pair of cotton pajama pants and ring them out then wear them covered with a dry pair overnight.     About Dry Skin    What is dry skin?  Common skin problem  Can be worse during the winter   Affects all ages  Occurs in people with or without other skin problems    What does it look like?  Fine lines in the skin become more visible   Rough feeling skin   Flaky skin  Most common on the arms and legs  Skin can become cracked, especially on the hands and feet    What are some problems caused by dry skin?   Itching  Rubbing or scratching can cause thickened, rough skin patches  Cracks in skin can be painful  Red, itchy, scaly skin (called eczema) can occur  Yellow crusting or pus could be signs of an infection    What causes dry skin?  A lack of water in the top layer of the skin  Too much soapy water,  hot water, or harsh chemicals  Aging and sun damage    How do I treat dry skin?  Shower or bathe daily for under ten minutes with lukewarm water and mild soap.  Pat yourself dry with a towel gently and leave your skin slightly damp.  Use moisturizing cream or ointment right away.  Avoid lotions.    What kind of mild soap should I be using?  Camay , Dove , Tone , Neutrogena , Purpose , or Oil of Olay   A non-detergent cleanser, like Cetaphil , can be used.    What should I stay away from?  Scented soaps   Bath oils    What moisturizers should I be using?  Cetaphil Cream,CeraVe Cream, Vanicream, Eucerin, Aquaphor, or Vaseline   Always apply after showering or bathing.  Reapply throughout the day, if possible.  If dry skin affects your " hands, always reapply after handwashing.    What else should I know?  Using a humidifier during winter months may help.  If dry skin gets worse or if eczema develops, a steroid cream may be needed.

## 2023-07-28 NOTE — PROGRESS NOTES
[facility] Dermatology Note  Encounter Date: Jul 28, 2023  Office Visit     Reviewed patients past medical history and pertinent chart review prior to patients visit today.     Dermatology Problem List:  Eczematous dermatitis versus irritant/contact dermatitis    ____________________________________________    Assessment & Plan:     # Eczematous dermatitis versus irritant/contact dermatitis  -For the facial dermatitis she will start using hydrocortisone 2.5% ointment twice daily.  She will discontinue all facial products and will wash the face with water only, she will plan to use only Aquaphor ointment or Vaseline as moisturizers for the lips.  I did discuss use of Vanicream moisturizing cream as her facial moisturizer.  I would like her to refrain from using any other products and creams on the hands or face to minimize reaction potential.  -For flares on the hands of the body start triamcinolone 0.1% ointment twice daily for 2-4 weeks, decreasing as patients rash improves, repeat cycle for flares. If not fully resolved in 1 month, patient advised to return to clinic for reevaluation. Discussed risk of skin atrophy with long term chronic use. Not for face, armpits or groin.   -When bathing, use gentle soap such as Dove for Sensitive Skin and lukewarm water on amrpits, groin, and other visibly dirty areas, all other areas let the water run over but no soap is necessary. Pat skin dry instead of vigorous rubbing. Encouraged regular use of an emollient such as Vanicream, Cera Ve Cream or Cetaphil Cream to the entire body.     Follow-up in 3 to 4 weeks, before she leaves to study abroad in [place], for recheck    Faina Yañez CNP  Dermatology   _______________________________________    CC: Rash (Rash around mouth and palms of hand/finger, neck. Flares at times and today looks better. Had since childhood ( in last year having lots of flares srinivas with warm weather). 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 Sandi Huston is a(n) 23 year old female who presents today as a new patient for rash on the lips and surrounding skin, hands and arms.  She has had eczema since she was a child but mostly in the creases of the elbows knees and face.  She seemed to grow out of this but about 5 years ago she started to get some rashes on her arms and hands and about a year ago she started getting more rash around her lips.  It is itchy red and flaky.  She has been using several over-the-counter facial products and lip products.  Currently she is mostly using A&E ointment, Aquaphor lip balm, and Vaseline but she does use other products on her face such as toners cleansers and moisturizers.    Patient is otherwise feeling well, without additional skin concerns.      Physical Exam:  Vitals: [facility] 06/03/2023 (Exact Date)   SKIN: Focused examination of hands, arms, face, neck was performed.  -Bilateral lips and surrounding skin especially on the upper cutaneous lip is consistent erythema and fine scaling  -A few patches of erythema and scale on the left palm and scattered on bilateral arms as well as some hyperpigmented areas from well-healed lesions    - No other lesions of concern on areas examined.     Medications:  No current outpatient medications on file.     No current facility-administered medications for this visit.      Past Medical History:   Patient Active Problem List   Diagnosis    Eczema    Irregular menses    Routine general medical examination at a health care facility    Allergic reaction to fish    Encounter for counseling for travel     No past medical history on file.    CC Referred Self, MD  No address on file on close of this encounter.

## 2023-08-16 ENCOUNTER — OFFICE VISIT (OUTPATIENT)
Dept: FAMILY MEDICINE | Facility: CLINIC | Age: 23
End: 2023-08-16
Payer: COMMERCIAL

## 2023-08-16 DIAGNOSIS — L30.9 ECZEMA, UNSPECIFIED TYPE: ICD-10-CM

## 2023-08-16 PROCEDURE — 99213 OFFICE O/P EST LOW 20 MIN: CPT | Performed by: NURSE PRACTITIONER

## 2023-08-16 RX ORDER — TACROLIMUS 1 MG/G
OINTMENT TOPICAL 2 TIMES DAILY
Qty: 60 G | Refills: 1 | Status: SHIPPED | OUTPATIENT
Start: 2023-08-16

## 2023-08-16 RX ORDER — TRIAMCINOLONE ACETONIDE 1 MG/G
CREAM TOPICAL 2 TIMES DAILY
Qty: 80 G | Refills: 1 | Status: SHIPPED | OUTPATIENT
Start: 2023-08-16

## 2023-08-16 ASSESSMENT — PAIN SCALES - GENERAL: PAINLEVEL: NO PAIN (0)

## 2023-08-16 NOTE — LETTER
8/16/2023         RE: Sandi Huston  [address] [facility] [address]        Dear Colleague,    Thank you for referring your patient, Sandi Huston, to the [facility]. Please see a copy of my visit note below.    [facility] Dermatology Note  Encounter Date: Aug 16, 2023  Office Visit     Reviewed patients past medical history and pertinent chart review prior to patients visit today.     Dermatology Problem List:  Eczematous dermatitis versus irritant/contact dermatitis    ____________________________________________    Assessment & Plan:     # Eczematous dermatitis versus irritant/contact dermatitis  -For the facial dermatitis restart hydrocortisone 2.5% ointment twice daily for 2 to 3 weeks.  I would like her to be clear for at least 5 days prior to discontinuation of the hydrocortisone as I think she is stopping the treatment too early and there is still underlying inflammation.  She will discontinue all facial products and will wash the face with water only, she will plan to use only Aquaphor ointment or Vaseline as moisturizers for the lips.  I did discuss use of Vanicream moisturizing cream as her facial moisturizer.  I would like her to refrain from using any other products and creams on the hands or face to minimize reaction potential.  -For flares on the hands of the body start triamcinolone 0.1% ointment twice daily for 2-4 weeks, decreasing as patients rash improves, repeat cycle for flares. If not fully resolved in 1 month, patient advised to return to clinic for reevaluation. Discussed risk of skin atrophy with long term chronic use. Not for face, armpits or groin.   -When bathing, use gentle soap such as Dove for Sensitive Skin and lukewarm water on amrpits, groin, and other visibly dirty areas, all other areas let the water run over but no soap is necessary. Pat skin dry instead of vigorous rubbing. Encouraged regular use of an emollient such as  Vanicream, Cera Ve Cream or Cetaphil Cream to the entire body.   -I gave her a prescription for tacrolimus 0.1% ointment to be used twice daily as needed if she is still struggling with either of these rashes after 1 month of steroid use.  I stressed the importance of not overusing steroids long-term and she will switch to this after 1 month if still flaring.    Follow-up when back in the country after her study abroad if still struggling with rashes    Faina Yañez CNP  Dermatology   _______________________________________    CC: Rash (F/U for rash around mouth, on both arms and hands)    HPI:  Ms. Sandi Huston is a(n) 23 year old female who presents today as a new patient for rash on the lips and surrounding skin, hands and arms.  She has had eczema since she was a child but mostly in the creases of the elbows knees and face.  She seemed to grow out of this but about 5 years ago she started to get some rashes on her arms and hands and about a year ago she started getting more rash around her lips.  It is itchy red and flaky.  She has been using several over-the-counter facial products and lip products.  Currently she is mostly using A&E ointment, Aquaphor lip balm, and Vaseline but she does use other products on her face such as toners cleansers and moisturizers.    Patient is otherwise feeling well, without additional skin concerns.      Physical Exam:  Vitals: [facility] 06/03/2023 (Exact Date)   SKIN: Focused examination of hands, arms, face, neck was performed.  -Bilateral lips and surrounding skin especially on the upper cutaneous lip is mild erythema and hyperpigmentation and fine scaling  -A few 1 cm or less patches of erythema and scale on the left palm and scattered on bilateral arms as well as some hyperpigmented areas from well-healed lesions    - No other lesions of concern on areas examined.     Medications:  Current Outpatient Medications   Medication     hydrocortisone 2.5 % ointment     tacrolimus  (PROTOPIC) 0.1 % external ointment     triamcinolone (KENALOG) 0.1 % external cream     triamcinolone (KENALOG) 0.1 % external ointment     No current facility-administered medications for this visit.      Past Medical History:   Patient Active Problem List   Diagnosis     Eczema     Irregular menses     Routine general medical examination at a health care facility     Allergic reaction to fish     Encounter for counseling for travel     History reviewed. No pertinent past medical history.    CC Referred Self, MD  No address on file on close of this encounter.       Again, thank you for allowing me to participate in the care of your patient.        Sincerely,        GURMEET Bro CNP

## 2023-08-16 NOTE — PROGRESS NOTES
[facility] Dermatology Note  Encounter Date: Aug 16, 2023  Office Visit     Reviewed patients past medical history and pertinent chart review prior to patients visit today.     Dermatology Problem List:  Eczematous dermatitis versus irritant/contact dermatitis    ____________________________________________    Assessment & Plan:     # Eczematous dermatitis versus irritant/contact dermatitis  -For the facial dermatitis restart hydrocortisone 2.5% ointment twice daily for 2 to 3 weeks.  I would like her to be clear for at least 5 days prior to discontinuation of the hydrocortisone as I think she is stopping the treatment too early and there is still underlying inflammation.  She will discontinue all facial products and will wash the face with water only, she will plan to use only Aquaphor ointment or Vaseline as moisturizers for the lips.  I did discuss use of Vanicream moisturizing cream as her facial moisturizer.  I would like her to refrain from using any other products and creams on the hands or face to minimize reaction potential.  -For flares on the hands of the body start triamcinolone 0.1% ointment twice daily for 2-4 weeks, decreasing as patients rash improves, repeat cycle for flares. If not fully resolved in 1 month, patient advised to return to clinic for reevaluation. Discussed risk of skin atrophy with long term chronic use. Not for face, armpits or groin.   -When bathing, use gentle soap such as Dove for Sensitive Skin and lukewarm water on amrpits, groin, and other visibly dirty areas, all other areas let the water run over but no soap is necessary. Pat skin dry instead of vigorous rubbing. Encouraged regular use of an emollient such as Vanicream, Cera Ve Cream or Cetaphil Cream to the entire body.   -I gave her a prescription for tacrolimus 0.1% ointment to be used twice daily as needed if she is still struggling with either of these rashes after 1 month of steroid use.  I stressed  the importance of not overusing steroids long-term and she will switch to this after 1 month if still flaring.    Follow-up when back in the country after her study abroad if still struggling with rashes    Faina Yañez CNP  Dermatology   _______________________________________    CC: Rash (F/U for rash around mouth, on both arms and hands)    HPI:  Ms. Sandi Huston is a(n) 23 year old female who presents today as a new patient for rash on the lips and surrounding skin, hands and arms.  She has had eczema since she was a child but mostly in the creases of the elbows knees and face.  She seemed to grow out of this but about 5 years ago she started to get some rashes on her arms and hands and about a year ago she started getting more rash around her lips.  It is itchy red and flaky.  She has been using several over-the-counter facial products and lip products.  Currently she is mostly using A&E ointment, Aquaphor lip balm, and Vaseline but she does use other products on her face such as toners cleansers and moisturizers.    Patient is otherwise feeling well, without additional skin concerns.      Physical Exam:  Vitals: LMP 06/03/2023 (Exact Date)   SKIN: Focused examination of hands, arms, face, neck was performed.  -Bilateral lips and surrounding skin especially on the upper cutaneous lip is mild erythema and hyperpigmentation and fine scaling  -A few 1 cm or less patches of erythema and scale on the left palm and scattered on bilateral arms as well as some hyperpigmented areas from well-healed lesions    - No other lesions of concern on areas examined.     Medications:  Current Outpatient Medications   Medication    hydrocortisone 2.5 % ointment    tacrolimus (PROTOPIC) 0.1 % external ointment    triamcinolone (KENALOG) 0.1 % external cream    triamcinolone (KENALOG) 0.1 % external ointment     No current facility-administered medications for this visit.      Past Medical History:   Patient Active Problem List    Diagnosis    Eczema    Irregular menses    Routine general medical examination at a health care facility    Allergic reaction to fish    Encounter for counseling for travel     History reviewed. No pertinent past medical history.    CC Referred Self, MD  No address on file on close of this encounter.

## 2024-07-14 ENCOUNTER — HEALTH MAINTENANCE LETTER (OUTPATIENT)
Age: 24
End: 2024-07-14

## 2024-10-12 SDOH — HEALTH STABILITY: PHYSICAL HEALTH: ON AVERAGE, HOW MANY MINUTES DO YOU ENGAGE IN EXERCISE AT THIS LEVEL?: 0 MIN

## 2024-10-12 SDOH — HEALTH STABILITY: PHYSICAL HEALTH: ON AVERAGE, HOW MANY DAYS PER WEEK DO YOU ENGAGE IN MODERATE TO STRENUOUS EXERCISE (LIKE A BRISK WALK)?: 0 DAYS

## 2024-10-12 ASSESSMENT — SOCIAL DETERMINANTS OF HEALTH (SDOH): HOW OFTEN DO YOU GET TOGETHER WITH FRIENDS OR RELATIVES?: ONCE A WEEK

## 2024-10-17 ENCOUNTER — OFFICE VISIT (OUTPATIENT)
Dept: FAMILY MEDICINE | Facility: CLINIC | Age: 24
End: 2024-10-17
Payer: COMMERCIAL

## 2024-10-17 VITALS
SYSTOLIC BLOOD PRESSURE: 119 MMHG | TEMPERATURE: 97.7 F | RESPIRATION RATE: 16 BRPM | HEIGHT: 59 IN | BODY MASS INDEX: 29.23 KG/M2 | WEIGHT: 145 LBS | DIASTOLIC BLOOD PRESSURE: 86 MMHG | HEART RATE: 75 BPM | OXYGEN SATURATION: 100 %

## 2024-10-17 DIAGNOSIS — Z00.00 ANNUAL PHYSICAL EXAM: Primary | ICD-10-CM

## 2024-10-17 DIAGNOSIS — L30.9 ECZEMA, UNSPECIFIED TYPE: ICD-10-CM

## 2024-10-17 DIAGNOSIS — F41.9 ANXIETY: ICD-10-CM

## 2024-10-17 PROBLEM — Z71.84 ENCOUNTER FOR COUNSELING FOR TRAVEL: Status: RESOLVED | Noted: 2023-04-17 | Resolved: 2024-10-17

## 2024-10-17 PROCEDURE — 99213 OFFICE O/P EST LOW 20 MIN: CPT | Mod: 25 | Performed by: PHYSICIAN ASSISTANT

## 2024-10-17 PROCEDURE — 99395 PREV VISIT EST AGE 18-39: CPT | Performed by: PHYSICIAN ASSISTANT

## 2024-10-17 RX ORDER — TRIAMCINOLONE ACETONIDE 1 MG/G
OINTMENT TOPICAL 2 TIMES DAILY
Qty: 80 G | Refills: 1 | Status: SHIPPED | OUTPATIENT
Start: 2024-10-17

## 2024-10-17 ASSESSMENT — PATIENT HEALTH QUESTIONNAIRE - PHQ9
SUM OF ALL RESPONSES TO PHQ QUESTIONS 1-9: 13
10. IF YOU CHECKED OFF ANY PROBLEMS, HOW DIFFICULT HAVE THESE PROBLEMS MADE IT FOR YOU TO DO YOUR WORK, TAKE CARE OF THINGS AT HOME, OR GET ALONG WITH OTHER PEOPLE: VERY DIFFICULT
SUM OF ALL RESPONSES TO PHQ QUESTIONS 1-9: 13

## 2024-10-17 NOTE — PROGRESS NOTES
SUBJECTIVE    Pah Phuong uHston is a 24 year old female who presents for an annual exam.    Other concerns today:  1.  Eczema  She needs refill for triamcinolone.    2.  Irregular periods  She used to get her period every month.  She notes that she has gained a little bit of weight.  Since then she feels like her periods have become more irregular, about every 4 to 5 months.  We discussed lab workup for this or monitoring.  She would like to monitor for now.    3.  Anxiety  She scored high on her PHQ-9 today.  She notes that she is having some anxiety.  Has not had any treatment for this before.      Working at her parents restaurant as a .  Living with her parents.      Patient Active Problem List    Diagnosis Date Noted    Allergic reaction to fish 04/17/2023     Priority: Medium    Eczema 07/08/2019     Priority: Medium    Irregular menses 07/08/2019     Priority: Medium        Immunization History   Administered Date(s) Administered    COVID-19 Bivalent 12+ (Pfizer) 06/28/2023    COVID-19 MONOVALENT 12+ (Pfizer) 03/16/2021, 04/06/2021    COVID-19 Monovalent 12+ (Pfizer 2022) 01/29/2022    DTaP, Unspecified 2001, 2001, 2001, 09/14/2004, 12/29/2006    Flu, Unspecified 10/28/2010, 09/22/2011, 10/30/2012, 12/19/2013, 11/04/2014, 10/20/2015    HIB, Unspecified 2001, 2001, 03/08/2002, 07/09/2002, 09/14/2004    HPV Quadrivalent 12/19/2013, 07/09/2014, 11/11/2015    HepA, Unspecified 12/19/2013, 07/09/2014    HepB, Unspecified 2001, 2001, 03/08/2002    Influenza (H1N1) 12/15/2009, 02/09/2010    Influenza (IIV3) PF 10/28/2010    Influenza Vaccine (Flucelvax Quadrivalent) 09/14/2024    Influenza Vaccine >6 months,quad, PF 11/15/2017, 10/20/2021    Influenza Vaccine, 6+MO IM (QUADRIVALENT W/PRESERVATIVES) 09/30/2018, 10/18/2019, 10/24/2020, 10/23/2022    MMR 07/09/2002, 09/21/2004    Mantoux Tuberculin Skin Test 04/17/2023    Meningococcal ACWY (Menactra ) 11/15/2017     Meningococcal Mcv4 Conjugate,unspecified  12/19/2013    Pneumo Conj 13-V (2010&after) 2001, 2001    Pneumococcal (PCV 7) 2001, 2001, 2001    Poliovirus, inactivated (IPV) 2001, 2001, 2001, 12/29/2006    TDAP (Adacel,Boostrix) 01/07/2013, 04/07/2013, 04/17/2023    Typhoid IM 11/14/2019    Varicella 09/21/2004, 12/29/2006       Patient's last menstrual period was 08/15/2024 (approximate).  OB History   No obstetric history on file.       Current Outpatient Medications   Medication Sig Dispense Refill    hydrocortisone 2.5 % ointment Apply topically 2 times daily To  facial rash rash. Stop when rash/itch resolves. 40 g 1    tacrolimus (PROTOPIC) 0.1 % external ointment Apply topically 2 times daily To rashes as needed 60 g 1    triamcinolone (KENALOG) 0.1 % external cream Apply topically 2 times daily To rash on hands/arms when flared, up to 1 month 80 g 1    triamcinolone (KENALOG) 0.1 % external ointment Apply topically 2 times daily. To rash on hands or body 80 g 1     No current facility-administered medications for this visit.       Past Medical History:   Diagnosis Date    Encounter for counseling for travel 04/17/2023       No past surgical history on file.     Family History   Problem Relation Age of Onset    No Known Problems Mother     No Known Problems Father     Congenital heart disease Sister     No Known Problems Brother     No Known Problems Brother     No Known Problems Brother     No Known Problems Brother     No Known Problems Brother           10/12/2024   General Health   How would you rate your overall physical health? Good   Feel stress (tense, anxious, or unable to sleep) Only a little      (!) STRESS CONCERN      10/12/2024   Nutrition   Three or more servings of calcium each day? (!) NO   Diet: Regular (no restrictions)   How many servings of fruit and vegetables per day? (!) 0-1   How many sweetened beverages each day? 0-1            10/12/2024  "  Exercise   Days per week of moderate/strenous exercise 0 days   Average minutes spent exercising at this level 0 min      (!) EXERCISE CONCERN      10/12/2024   Social Factors   Frequency of gathering with friends or relatives Once a week   Worry food won't last until get money to buy more No   Food not last or not have enough money for food? No   Do you have housing? (Housing is defined as stable permanent housing and does not include staying ouside in a car, in a tent, in an abandoned building, in an overnight shelter, or couch-surfing.) Yes   Are you worried about losing your housing? No   Lack of transportation? No   Unable to get utilities (heat,electricity)? No            10/12/2024   Dental   Dentist two times every year? Yes        Today's PHQ-9 Score:       10/17/2024    11:13 AM   PHQ-9 SCORE   PHQ-9 Total Score MyChart 13 (Moderate depression)   PHQ-9 Total Score 13         10/12/2024   Substance Use   Alcohol more than 3/day or more than 7/wk No   Do you use any other substances recreationally? No        Social History     Tobacco Use    Smoking status: Never     Passive exposure: Never    Smokeless tobacco: Never    Tobacco comments:     no secondhand smoke   Vaping Use    Vaping status: Never Used   Substance Use Topics    Alcohol use: Never    Drug use: Never         10/12/2024   STI Screening   New sexual partner(s) since last STI/HIV test? No            4/17/2023    10:44 AM   PAP / HPV   PAP Negative for Intraepithelial Lesion or Malignancy (NILM)            10/12/2024   Contraception/Family Planning   Questions about contraception or family planning No      Reviewed and updated as needed this visit by Provider        OBJECTIVE    Vitals:    10/17/24 1122   BP: 119/86   BP Location: Left arm   Patient Position: Sitting   Cuff Size: Adult Regular   Pulse: 75   Resp: 16   Temp: 97.7  F (36.5  C)   TempSrc: Oral   SpO2: 100%   Weight: 65.8 kg (145 lb)   Height: 1.51 m (4' 11.45\")       Body mass " index is 28.85 kg/m .    Physical Exam:  General: patient is alert and oriented x 3, in no apparent distress, appropriately groomed with normal affect  HEENT, Thyroid, Lymphatic, Cardiac, Pulmonary, GI, Musculoskeletal, Skin, and Neuro exams were completed today and grossly normal  Breast exam: Deferred  Genitourinary: Deferred      ASSESSMENT and PLAN  1. Annual physical exam  Health maintenance is discussed with patient as appropriate for age and risk factors.  She declines COVID booster shot.  She already got her flu shot this year.  She declines offer of birth control, she declines STD screening.    2. Eczema, unspecified type  Chronic, stable.  Well-controlled with triamcinolone.  Refill sent today.  - triamcinolone (KENALOG) 0.1 % external ointment; Apply topically 2 times daily. To rash on hands or body  Dispense: 80 g; Refill: 1    3. Anxiety  New concern.  She notes that she has had increased anxiety recently.  She is not interested in counseling at this time.  She is not interested in medication.  She will continue to monitor this.  She is encouraged to contact us if anything worsens or if she has questions.  Patient denies any thoughts of wanting to hurt him/herself or others and does contract for safety.      This dictation uses voice recognition software, which may contain typographical errors.

## 2024-10-19 PROBLEM — F41.9 ANXIETY: Status: ACTIVE | Noted: 2024-10-19

## 2025-03-27 ENCOUNTER — OFFICE VISIT (OUTPATIENT)
Dept: DERMATOLOGY | Facility: CLINIC | Age: 25
End: 2025-03-27
Payer: COMMERCIAL

## 2025-03-27 DIAGNOSIS — L30.9 ECZEMA, UNSPECIFIED TYPE: Primary | ICD-10-CM

## 2025-03-27 ASSESSMENT — PAIN SCALES - GENERAL: PAINLEVEL_OUTOF10: MILD PAIN (2)

## 2025-03-27 NOTE — PROGRESS NOTES
[facility] Dermatology Note  Encounter Date: Mar 27, 2025  Office Visit     Reviewed patients past medical history and pertinent chart review prior to patients visit today.     Dermatology Problem List:  # Dermatitis, atopic vs allergic contact  - clobetasol 0.05% ointment, tacrolimus 0.1% ointment, hydrocortisone 2.5% ointment  - Past: triamcinolone 0.1% ointment    ____________________________________________    Assessment & Plan:     # Dermatitis, atopic vs allergic contact  - Chronic, flared  Body:  - For flares: Start clobetasol 0.05% ointment twice daily for 2 weeks. Discussed risk of skin atrophy with long term chronic use. Not for face, armpits or groin.   - For maintenance: Start tacrolimus 0.1% ointment twice daily as needed.     Face:  - For flares: Start hydrocortisone 2.5% ointment twice daily for 7 days. Discussed risk of skin atrophy with long term chronic use.   - For maintenance: Start tacrolimus 0.1% ointment twice daily as needed.     - We discussed the importance of daily emollients. Options include Vanicream, CeraVe Cream, Cetaphil Cream, or Vaseline. Avoid hot water when bathing. Use non-scented soaps and detergents. Pat skin dry instead of vigorous rubbing.     - Wet dressings: Once daily apply a thick layer of medication/ moisturizer to affected areas. Wet cotton pajamas/ long sleeve shirt/ towels with warm water and wring out excess water. Apply the wet cloth to the skin over the medication/ moisturizer for 20- 60 minutes. Remove wet cloth and rub in excess medication/ moisturizer.     Return to clinic in 3 months, sooner PRN for new or worsening condition    All risks, benefits and alternatives were discussed with patient.  Patient is in agreement and understands the assessment and plan.  All questions were answered.  Kristina Howell PA-C  [facility] Dermatology  _______________________________________    CC: Derm Problem and Rash (Rash spreading since  January)    HPI:  Ms. Sandi Huston is a(n) 24 year old female who presents today as a return patient for rash.  She reports a rash on the face and arms that has been flaring for weeks.  The rash is very itchy.  She reports a history of eczema for many years. Past flares resolved with topical steroids and tacrolimus.  She has not applied any topical medications since the most recent flare.  No new products, medications, or illnesses prior to the most recent flare.  She uses nonscented products and applies Vaseline to her skin.  Patient is otherwise feeling well, without additional skin concerns.      Physical Exam:  SKIN: Focused examination of face and arms was performed.  - Pink patch(es) with thin scale involving the upper lip and bilateral arms, consistent with atopic dermatitis.       - No other lesions of concern on areas examined.     Medications:  Current Outpatient Medications   Medication Sig Dispense Refill    hydrocortisone 2.5 % ointment Apply topically 2 times daily To  facial rash rash. Stop when rash/itch resolves. 40 g 1    tacrolimus (PROTOPIC) 0.1 % external ointment Apply topically 2 times daily To rashes as needed 60 g 1    triamcinolone (KENALOG) 0.1 % external cream Apply topically 2 times daily To rash on hands/arms when flared, up to 1 month 80 g 1    triamcinolone (KENALOG) 0.1 % external ointment Apply topically 2 times daily. To rash on hands or body 80 g 1     No current facility-administered medications for this visit.      Past Medical History:   Patient Active Problem List   Diagnosis    Eczema    Irregular menses    Allergic reaction to fish    Anxiety     Past Medical History:   Diagnosis Date    Encounter for counseling for travel 04/17/2023       CC Referred Self, MD  No address on file on close of this encounter.

## 2025-03-27 NOTE — LETTER
3/27/2025      [facility] Phuong Huston  [address] [id number]      Dear Colleague,    Thank you for referring your patient, Sandi Huston, to the [facility]. Please see a copy of my visit note below.    [facility] Dermatology Note  Encounter Date: Mar 27, 2025  Office Visit     Reviewed patients past medical history and pertinent chart review prior to patients visit today.     Dermatology Problem List:  # Dermatitis, atopic vs allergic contact  - clobetasol 0.05% ointment, tacrolimus 0.1% ointment, hydrocortisone 2.5% ointment  - Past: triamcinolone 0.1% ointment    ____________________________________________    Assessment & Plan:     # Dermatitis, atopic vs allergic contact  - Chronic, flared  Body:  - For flares: Start clobetasol 0.05% ointment twice daily for 2 weeks. Discussed risk of skin atrophy with long term chronic use. Not for face, armpits or groin.   - For maintenance: Start tacrolimus 0.1% ointment twice daily as needed.     Face:  - For flares: Start hydrocortisone 2.5% ointment twice daily for 7 days. Discussed risk of skin atrophy with long term chronic use.   - For maintenance: Start tacrolimus 0.1% ointment twice daily as needed.     - We discussed the importance of daily emollients. Options include Vanicream, CeraVe Cream, Cetaphil Cream, or Vaseline. Avoid hot water when bathing. Use non-scented soaps and detergents. Pat skin dry instead of vigorous rubbing.     - Wet dressings: Once daily apply a thick layer of medication/ moisturizer to affected areas. Wet cotton pajamas/ long sleeve shirt/ towels with warm water and wring out excess water. Apply the wet cloth to the skin over the medication/ moisturizer for 20- 60 minutes. Remove wet cloth and rub in excess medication/ moisturizer.     Return to clinic in 3 months, sooner PRN for new or worsening condition    All risks, benefits and alternatives were discussed with patient.  Patient is in  agreement and understands the assessment and plan.  All questions were answered.  Kristina Howell PA-C  [facility] Dermatology  _______________________________________    CC: Derm Problem and Rash (Rash spreading since January)    HPI:  Ms. Sandi Huston is a(n) 24 year old female who presents today as a return patient for rash.  She reports a rash on the face and arms that has been flaring for weeks.  The rash is very itchy.  She reports a history of eczema for many years. Past flares resolved with topical steroids and tacrolimus.  She has not applied any topical medications since the most recent flare.  No new products, medications, or illnesses prior to the most recent flare.  She uses nonscented products and applies Vaseline to her skin.  Patient is otherwise feeling well, without additional skin concerns.      Physical Exam:  SKIN: Focused examination of face and arms was performed.  - Pink patch(es) with thin scale involving the upper lip and bilateral arms, consistent with atopic dermatitis.       - No other lesions of concern on areas examined.     Medications:  Current Outpatient Medications   Medication Sig Dispense Refill     hydrocortisone 2.5 % ointment Apply topically 2 times daily To  facial rash rash. Stop when rash/itch resolves. 40 g 1     tacrolimus (PROTOPIC) 0.1 % external ointment Apply topically 2 times daily To rashes as needed 60 g 1     triamcinolone (KENALOG) 0.1 % external cream Apply topically 2 times daily To rash on hands/arms when flared, up to 1 month 80 g 1     triamcinolone (KENALOG) 0.1 % external ointment Apply topically 2 times daily. To rash on hands or body 80 g 1     No current facility-administered medications for this visit.      Past Medical History:   Patient Active Problem List   Diagnosis     Eczema     Irregular menses     Allergic reaction to fish     Anxiety     Past Medical History:   Diagnosis Date     Encounter for counseling for travel 04/17/2023       CC  Referred Self, MD  No address on file on close of this encounter.       Again, thank you for allowing me to participate in the care of your patient.        Sincerely,        Kristina Howell PA-C    Electronically signed

## 2025-03-27 NOTE — PATIENT INSTRUCTIONS
"Eczema Action Plan    Name: formerly [facility] Phuong Huston Provider: DONA VILLA Date: 3/27/2025    Step 1:  Eczema Flare (Eczema is out of control and not responding to maintenance care)     Continue below procedures  Also, use prescribed steroid two times a day for up to two weeks per month. Apply to red and itchy areas. Put the steroid on the skin first before other creams.   Face: hydrocortisone 2.5% ointment twice daily for 5-7 days as needed  Body: clobetasol 0.05% ointment twice daily for 2 weeks as needed     Moisturize your whole body two times a day with a suggested cream.    Step 2: Eczema Under Control (Skin is soft and flexible, not red or itchy)  For maintenance: tacrolimus 0.1% ointment twice daily     Moisturize the whole body at least two times a day.  Watch for signs that the skin is turning red, itchy, or dry.  Use a cream in a jar from the list below.   Suggested creams:  CeraVe Cream, Cetaphil Cream, Vanicream, Aquaphor, Vaseline  Use a gentle cleanser/soap in the bath/shower such as dove sensitive cleanser or Cetaphil cleanser only to the armpits and groin areas, or where you are visibly dirty. All other areas should get washed with water only. Do not scrub. After bathing pat the skin dry with a towel instead of rubbing dry. Apply your moisturizer while you're still slightly damp to lock in some of the moisture.     Wet Wraps  If your rash is very itchy or getting out of control you can also try applying what we call \"wet wraps\". Apply your moisturizers/medications to involved areas of the skin. Wet cotton clothing/ cloth with warm water and wring out excess water. Cover the involved area of the body with the clothing/ cloth. After 20-60 minutes, remove the clothing/cloth and rub in excess moisturizer/medication.       About Dry Skin    What is dry skin?  Common skin problem  Can be worse during the winter   Affects all ages  Occurs in people with or without other skin problems    What does it look " like?  Fine lines in the skin become more visible   Rough feeling skin   Flaky skin  Most common on the arms and legs  Skin can become cracked, especially on the hands and feet    What are some problems caused by dry skin?   Itching  Rubbing or scratching can cause thickened, rough skin patches  Cracks in skin can be painful  Red, itchy, scaly skin (called eczema) can occur  Yellow crusting or pus could be signs of an infection    What causes dry skin?  A lack of water in the top layer of the skin  Too much soapy water,  hot water, or harsh chemicals  Aging and sun damage    How do I treat dry skin?  Shower or bathe daily for under ten minutes with lukewarm water and mild soap.  Pat yourself dry with a towel gently and leave your skin slightly damp.  Use moisturizing cream or ointment right away.  Avoid lotions.    What kind of mild soap should I be using?  Cetaphil , Dove , Neutrogena , Vanicream , or Oil of Olay     What should I stay away from?  Scented soaps   Bath oils  Exfoliants     What moisturizers should I be using?  Cetaphil Cream,CeraVe Cream, Vanicream, Aquaphor, or Vaseline   Always apply after showering or bathing.  Reapply throughout the day, if possible.  If dry skin affects your hands, always reapply after handwashing.    What else should I know?  Using a humidifier during winter months may help.  If dry skin gets worse or if eczema develops, a steroid cream may be needed.

## 2025-03-28 ENCOUNTER — TELEPHONE (OUTPATIENT)
Dept: DERMATOLOGY | Facility: CLINIC | Age: 25
End: 2025-03-28
Payer: COMMERCIAL

## 2025-03-28 DIAGNOSIS — L30.9 ECZEMA, UNSPECIFIED TYPE: Primary | ICD-10-CM

## 2025-03-28 NOTE — TELEPHONE ENCOUNTER
[facility] Call Center    Phone Message    May a detailed message be left on voicemail: yes     Reason for Call: Medication Question or concern regarding medication   Prescription Clarification  Name of Medication: clobetasol 0.05% ointment, tacrolimus 0.1% ointment, hydrocortisone 2.5% ointment    Prescribing Provider: Kristina GERARD   Pharmacy: [facility] - [place], [place] - [id number]     What on the order needs clarification? Patient did not receive any of the medication refills. Please send Troveboxt message if patient doesn't answer the phone as patient doesn't always get the voicemail's. Thanks.       Action Taken: te sent    Travel Screening: Not Applicable     Date of Service:

## 2025-03-31 RX ORDER — TACROLIMUS 1 MG/G
OINTMENT TOPICAL 2 TIMES DAILY PRN
Qty: 100 G | Refills: 11 | Status: SHIPPED | OUTPATIENT
Start: 2025-03-31

## 2025-03-31 RX ORDER — HYDROCORTISONE 25 MG/G
OINTMENT TOPICAL 2 TIMES DAILY
Qty: 30 G | Refills: 1 | Status: SHIPPED | OUTPATIENT
Start: 2025-03-31

## 2025-03-31 RX ORDER — CLOBETASOL PROPIONATE 0.5 MG/G
OINTMENT TOPICAL 2 TIMES DAILY
Qty: 45 G | Refills: 3 | Status: SHIPPED | OUTPATIENT
Start: 2025-03-31

## 2025-03-31 NOTE — PROGRESS NOTES
ORTHOPAEDIC HAND, WRIST, AND ELBOW OFFICE  VISIT     Name: Patrick Frey      : 1941      MRN: [id number]  Encounter Provider: Radha Maher MD  Encounter Date: 3/31/2025   Encounter department: [facility] [place]  :  Assessment & Plan  Carpal tunnel syndrome, bilateral  Subjective history, clinical exam, and diagnostic studies reviewed with patient  Diagnosis discussed  Treatment options discussed which include nonoperative and operative management.  We discussed use of splinting, therapy, activity modification, use of NSAIDs (oral and topical), and injections.  Discussed use of MSK US in diagnosis of peripheral nerve compression. We discussed risks and benefits of each and well as expected reasonable outcomes.  The patient has had bilateral CTRs.  We discussed the incision and recovery will be longer for recurrent CTS.    Also explained to patient that symptoms may be related to cervical radiculopathy.   The patient was given the opportunity to ask questions.  Questions were answered to the patient's satisfaction.  The patient decided to move forward with US for evaluation of carpal tunnel via shared decision making.  Follow up after Ultrasound. If MSK US are negative, consider spine and pain management referral.  Orders:    US Carpal Tunnel; Future          General Discussions:  Carpal Tunnel Syndrome: The anatomy and physiology of carpal tunnel syndrome was discussed with the patient today.  Increase pressure localized under the transverse carpal ligament can cause pain, numbness, tingling, or dysesthesias within the median nerve distribution as well as feelings of fatigue, clumsiness, or awkwardness.  These symptoms typically occur at night and worse in the morning upon waking.  Eventually, untreated carpal tunnel syndrome can result in weakness and permanent loss of muscle within the thenar compartment of the hand.  Treatment options were discussed with the     SUBJECTIVE:   HPI: Sandi Baca is an 22 year old who presents for preventive health visit.  She is going to be studying abroad in [place] starting this fall, so she is needing a comprehensive visit today including a chest x-ray, PPD test, urinalysis and hemoglobin.  Additionally, she is hoping to discuss allergies.  She states that recently she has developed what seems consistent with an allergic reaction to fish.  She will have numb/itchy gums and lips, which will then swell.  She denies any airway involvement, dysphagia.  He also has similar symptoms, albeit more mild, with chicken.  She is requesting an allergy test today, especially as she will be in a foreign country in a few months.        4/17/2023    10:09 AM   Additional Questions   Roomed by as   Accompanied by self         4/17/2023    10:09 AM   Patient Reported Additional Medications   Patient reports taking the following new medications na   Patient has been advised of split billing requirements and indicates understanding: Yes  Healthy Habits:     Getting at least 3 servings of Calcium per day:  NO    Bi-annual eye exam:  Yes    Dental care twice a year:  NO    Sleep apnea or symptoms of sleep apnea:  None    Diet:  Low salt and Breakfast skipped    Frequency of exercise:  None    Taking medications regularly:  Yes    Medication side effects:  None    PHQ-2 Total Score: 1    Additional concerns today:  No    Works at [employer], which has affected her diet somewhat. Does not regularly exercise.     Today's PHQ-2 Score:       4/17/2023    10:08 AM   PHQ-2 ( 1999 [employer])   Q1: Little interest or pleasure in doing things 1   Q2: Feeling down, depressed or hopeless 0   PHQ-2 Score 1   Q1: Little interest or pleasure in doing things Several days    Several days   Q2: Feeling down, depressed or hopeless Not at all    Not at all   PHQ-2 Score 1    1       Have you ever done Advance Care Planning? (For example, a Health Directive, POLST, or a discussion with  "patient.  Conservative treatment includes nocturnal resting splints to keep the nerve in a neutral position, ergonomic changes within the work or home environment, activity modification, and tendon gliding exercises.  Steroid injections within the carpal canal can help a majority of patients, however this is often self-limited in a most patients.  Surgical intervention to divide the transverse carpal ligament typically results in a long-lasting relief of the patient's complaints, with the recurrence rate of less than 5%.     Operative Discussions:       History of Present Illness   HPI  Chief Complaint   Patient presents with    Left Hand - Tingling, Follow-up    Right Hand - Tingling, Follow-up     Right hand worse       Patrick Frey is a 83 y.o. male who presents today for follow up for bilateral carpal tunnel syndrome. He reports having continued tingling in the fingers of both hands. He had bilateral cortisone injections at his last appointment on 12/13/2024. He reports the injections did provide him with relief.     History of left CTR with Dr. Poole in 2018.  R CTR was done many years ago. Patient believes he had revision R CTR.    Hand dominance: right    REVIEW OF SYSTEMS:  General: no fever, no chills  HEENT:  No loss of hearing or eyesight problems  Eyes:  No red eyes  Respiratory:  No coughing, shortness of breath or wheezing  Cardiovascular:  No chest pain, no palpitations  GI:  Abdomen soft nontender, denies nausea  Endocrine:  No muscle weakness, no frequent urination, no excessive thirst  Urinary:  No dysuria, no incontinence  Musculoskeletal: see HPI and PE  SKIN:  No skin rash, no dry skin  Neurological:  No headaches, no confusion  Psychiatric:  No suicide thoughts, no anxiety, no depression  Review of all other systems is negative    Objective   Ht 5' 8\" (1.727 m)   BMI 22.66 kg/m²      General: well developed and well nourished, alert, oriented times 3, and appears comfortable  Psychiatric: " a medical provider or your loved ones about your wishes): Yes, patient states has an Advance Care Planning document and will bring a copy to the clinic.    Social History     Tobacco Use     Smoking status: Never     Smokeless tobacco: Never     Tobacco comments:     no secondhand smoke   Vaping Use     Vaping status: Not on file   Substance Use Topics     Alcohol use: Not on file           4/17/2023    10:08 AM   Alcohol Use   Prescreen: >3 drinks/day or >7 drinks/week? No     Reviewed orders with patient.  Reviewed health maintenance and updated orders accordingly - Yes  Labs reviewed in EPIC  BP Readings from Last 3 Encounters:   04/17/23 117/80   06/02/21 96/72   11/14/19 98/70    Wt Readings from Last 3 Encounters:   04/17/23 57.6 kg (127 lb)   06/02/21 51.7 kg (114 lb)   11/14/19 60.8 kg (134 lb) (66 %, Z= 0.41)*     * Growth percentiles are based on [facility] (Girls, 2-20 Years) data.                  Patient Active Problem List   Diagnosis     Eczema     Irregular menses     Routine general medical examination at a health care facility     Allergic reaction to fish     Encounter for counseling for travel     History reviewed. No pertinent surgical history.    Social History     Tobacco Use     Smoking status: Never     Smokeless tobacco: Never     Tobacco comments:     no secondhand smoke   Vaping Use     Vaping status: Not on file   Substance Use Topics     Alcohol use: Not on file     Family History   Problem Relation Age of Onset     No Known Problems Mother      No Known Problems Father      Congenital heart disease Sister      No Known Problems Brother      No Known Problems Brother      No Known Problems Brother      No Known Problems Brother      No Known Problems Brother          Current Outpatient Medications   Medication Sig Dispense Refill     triamcinolone (KENALOG) 0.1 % cream [TRIAMCINOLONE (KENALOG) 0.1 % CREAM] Apply topically 2 (two) times a day.       No Known Allergies  Recent Labs   Lab Test  "07/08/19  1623   TSH 1.98        Breast Cancer Screening:        History of abnormal Pap smear: NO - age 21-29 PAP every 3 years recommended      4/17/2023    10:44 AM   PAP / HPV   PAP Negative for Intraepithelial Lesion or Malignancy (NILM)       Reviewed and updated as needed this visit by clinical staff   Tobacco  Allergies  Meds  Problems  Med Hx  Surg Hx  Fam Hx          Reviewed and updated as needed this visit by Provider   Tobacco  Allergies  Meds  Problems  Med Hx  Surg Hx  Fam Hx         Review of Systems   Constitutional: Negative for chills and fever.   HENT: Negative for congestion, ear pain, hearing loss and sore throat.    Eyes: Negative for pain and visual disturbance.   Respiratory: Negative for cough and shortness of breath.    Cardiovascular: Negative for chest pain, palpitations and peripheral edema.   Gastrointestinal: Negative for abdominal pain, constipation, diarrhea, heartburn, hematochezia and nausea.   Breasts:  Negative for tenderness, breast mass and discharge.   Genitourinary: Negative for dysuria, frequency, genital sores, hematuria, pelvic pain, urgency, vaginal bleeding and vaginal discharge.   Musculoskeletal: Negative for arthralgias, joint swelling and myalgias.   Skin: Positive for rash.   Neurological: Negative for dizziness, weakness, headaches and paresthesias.   Psychiatric/Behavioral: Negative for mood changes. The patient is not nervous/anxious.         OBJECTIVE:   /80 (BP Location: Left arm, Patient Position: Left side, Cuff Size: Adult Regular)   Pulse 76   Ht 1.511 m (4' 11.5\")   Wt 57.6 kg (127 lb)   SpO2 99%   BMI 25.22 kg/m       Physical Exam  GENERAL: healthy, alert and no distress  EYES: Eyes grossly normal to inspection, PERRL and conjunctivae and sclerae normal  HENT: ear canals and TM's normal, nose and mouth without ulcers or lesions  NECK: no adenopathy, no asymmetry, masses, or scars and thyroid normal to palpation  RESP: lungs " Normal  HEENT: Trachea Midline, No torticollis  Cardiovascular: No discernable arrhythmia  Pulmonary: No wheezing or stridor  Abdomen: No rebound or guarding  Extremities: No peripheral edema  Skin: No masses, erythema, lacerations, fluctation, ulcerations  Neurovascular: Sensation Intact to the Median, Ulnar, Radial Nerve, Motor Intact to the Median, Ulnar, Radial Nerve, and Pulses Intact    Musculoskeletal exam:  Scar on left cw PSH. No obvious scar on the right. No swelling or ecchymosis.  No deformity.  Hand and fingers were warm and well-perfused.  Capillary refill was brisk.  Full active range of motion of the elbows, forearms, wrists, and fingers.  5/5 elbow flexors/extensors, wrist flexor/extensors, and .      Sensation was not decreased in the median nerve distribution.  Sensation was not decreased in the ulnar nerve distribution.  There was not APB atrophy.  There was no intrinsic atrophy. Tinel's at the wrist was Positive. Tinel's at the elbow was Negative. Wrist flexion compression was positive bilaterally. Evaluation for lacertus syndrome was Negative.     STUDIES REVIEWED:  No Studies to review      PROCEDURES PERFORMED:  Procedures  No Procedures performed today      Scribe Attestation      I,:  Abisai Dorsey am acting as a scribe while in the presence of the attending physician.:       I,:  Radha Maher MD personally performed the services described in this documentation    as scribed in my presence.:            clear to auscultation - no rales, rhonchi or wheezes  BREAST: normal without masses, tenderness or nipple discharge and no palpable axillary masses or adenopathy  CV: regular rate and rhythm, normal S1 S2, no S3 or S4, no murmur, click or rub, no peripheral edema and peripheral pulses strong  ABDOMEN: soft, nontender, no hepatosplenomegaly, no masses and bowel sounds normal   (female): normal female external genitalia, normal urethral meatus, vaginal mucosa pink, moist, well rugated, and normal cervix/adnexa/uterus without masses or discharge  MS: no gross musculoskeletal defects noted, no edema  SKIN: no suspicious lesions or rashes  NEURO: Normal strength and tone, mentation intact and speech normal  PSYCH: mentation appears normal, affect normal/bright    Diagnostic Test Results:  Labs reviewed in Epic    Recent Results (from the past 168 hour(s))   Pap Screen only - recommended age 21 - 24 years   Result Value Ref Range    Interpretation        Negative for Intraepithelial Lesion or Malignancy (NILM)    Comment         Papanicolaou Test Limitations:  Cervical cytology is a screening test with limited sensitivity, and regular screening is critical for cancer prevention.  Pap tests are primarily effective for the diagnosis/prevention of squamous cell carcinoma, not adenocarcinoma or other cancers.        Specimen Adequacy       Satisfactory for evaluation, endocervical/transformation zone component present    Clinical Information       irregular bleeding      Reflex Testing No     Previous Abnormal?       No      Performing Labs       The technical component of this testing was completed at [facility]     HIV Antigen Antibody Combo   Result Value Ref Range    HIV Antigen Antibody Combo Nonreactive Nonreactive   Hepatitis C Screen Reflex to HCV RNA Quant and Genotype   Result Value Ref Range    Hepatitis C Antibody Nonreactive Nonreactive   Allergy  adult food panel   Result Value Ref Range    Immunoglobulin E 250 (H) 0 - 114 kU/L    [place], Food IgE <0.10 <0.10 KU(A)/L    Cashew, Food IgE <0.10 <0.10 KU(A)/L    Codfish IgE 9.39 (H) <0.10 KU(A)/L    Egg White IgE <0.10 <0.10 KU(A)/L    Hazelnut IgE <0.10 <0.10 KU(A)/L    Milk, Cow IgE 0.30 (H) <0.10 KU(A)/L    Peanut IgE <0.10 <0.10 KU(A)/L    [place] IgE 6.93 (H) <0.10 KU(A)/L    Scallop IgE <0.10 <0.10 KU(A)/L    Sesame Seed IgE <0.10 <0.10 KU(A)/L    Shrimp IgE <0.10 <0.10 KU(A)/L    Soybean IgE <0.10 <0.10 KU(A)/L    Tuna IgE 3.55 (H) <0.10 KU(A)/L    [place], Food IgE <0.10 <0.10 KU(A)/L    Wheat IgE <0.10 <0.10 KU(A)/L   Hemoglobin   Result Value Ref Range    Hemoglobin 13.6 11.7 - 15.7 g/dL   NEISSERIA GONORRHOEA PCR    Specimen: Urine, Voided   Result Value Ref Range    Neisseria gonorrhoeae Negative Negative   CHLAMYDIA TRACHOMATIS PCR    Specimen: Urine, Voided   Result Value Ref Range    Chlamydia trachomatis Negative Negative   Urinalysis Macroscopic   Result Value Ref Range    Color Urine Yellow Colorless, Straw, Light Yellow, Yellow    Appearance Urine Clear Clear    Glucose Urine Negative Negative mg/dL    Bilirubin Urine Negative Negative    Ketones Urine Negative Negative mg/dL    Specific Gravity Urine 1.020 1.005 - 1.030    Blood Urine Negative Negative    pH Urine 6.5 5.0 - 8.0    Protein Albumin Urine Negative Negative mg/dL    Urobilinogen Urine 0.2 0.2, 1.0 E.U./dL    Nitrite Urine Negative Negative    Leukocyte Esterase Urine Negative Negative   Albumin Random Urine Quantitative with Creat Ratio   Result Value Ref Range    Creatinine Urine mg/dL 111.0 mg/dL    Albumin Urine mg/L <12.0 mg/L    Albumin Urine mg/g Cr         EXAM: XR CHEST 2 VIEWS  LOCATION: [facility]  DATE/TIME: 4/17/2023 11:20 AM CDT     INDICATION:  Screening examination for pulmonary tuberculosis  COMPARISON: Left clavicle films 05/28/2018                                                                      IMPRESSION: Lungs are clear. Heart and pulmonary vascularity are normal. No signs of acute disease. No signs of TB.    ASSESSMENT/PLAN:     Problem List Items Addressed This Visit        Immune    Allergic reaction to fish     Discussed with patient that as she is traveling to [place] in a couple of months and that she has had new emerging food allergens, it would likely be beneficial to obtain an evaluation.  Offered an allergist referral versus basic allergen panel today in clinic.  Patient has elected to proceed with blood work today, and will refer to allergy if needed.         Relevant Orders    Allergy adult food panel (Completed)       Other    Routine general medical examination at a [facility] care facility - Primary     Annual examination.  Patient is at average risk of breast and colon cancer, so screening has not been initiated yet.  Initial Pap was done today after thorough explanation of procedure and monitoring guidelines.  HIV and hepatitis C routine screening labs were completed today.  Additionally, gonorrhea and chlamydia screenings were completed today as is recommended; patient denies any current concerns.  Obtained variety of diagnostics related to patient's upcoming travel to [facility], documented elsewhere.  Discussed diet and exercise habits; encourage patient to begin to incorporate regular cardiovascular exercise into her days.  Discussed bone health, including adequate calcium and vitamin D intake with weightbearing exercise.  Follow-up in 1 year or sooner with any acute concerns.         Relevant Orders    HEARING SCREENING (Completed)    Encounter for counseling for travel     Reviewed the documentation the patient provided for upcoming study abroad program.  Hearing and vision was completed today per this request.  Chest x-ray was obtained.  PPD was applied today, and patient will return to clinic in 3 days for reevaluation.  She will also complete a UA at that time, as we did routine  "STI screening today.  Hemoglobin was normal.  Updated patient's tetanus vaccine today; she is up-to-date on all other recommendations.  I did encourage her to schedule a travel visit to a couple of weeks prior to her leaving the country, she discussed recommended vaccinations or other precautions specific travel to [place].         Relevant Orders    Hemoglobin (Completed)    HEARING SCREENING (Completed)    Urinalysis Macroscopic (Completed)    Albumin Random Urine Quantitative with Creat Ratio (Completed)   Other Visit Diagnoses     Screening for STDs (sexually transmitted diseases)        Relevant Orders    NEISSERIA GONORRHOEA PCR (Completed)    CHLAMYDIA TRACHOMATIS PCR (Completed)    Screening for HIV (human immunodeficiency virus)        Relevant Orders    HIV Antigen Antibody Combo (Completed)    Need for hepatitis C screening test        Relevant Orders    Hepatitis C Screen Reflex to HCV RNA Quant and Genotype (Completed)    Cervical cancer screening        Relevant Orders    Pap Screen only - recommended age 21 - 24 years (Completed)    Screening examination for pulmonary tuberculosis        Relevant Orders    XR Chest 2 Views (Completed)    Screening for deficiency anemia        Relevant Orders    Hemoglobin (Completed)          COUNSELING:  Reviewed preventive health counseling, as reflected in patient instructions       Regular exercise       Healthy diet/nutrition       Immunizations    Vaccinated for: TDAP         Osteoporosis prevention/bone health    BMI:   Estimated body mass index is 25.22 kg/m  as calculated from the following:    Height as of this encounter: 1.511 m (4' 11.5\").    Weight as of this encounter: 57.6 kg (127 lb).     She reports that she has never smoked. She has never used smokeless tobacco.    Halima Johnson, GURMEET ENCARNACION  [facility]"

## 2025-06-19 ENCOUNTER — OFFICE VISIT (OUTPATIENT)
Dept: DERMATOLOGY | Facility: CLINIC | Age: 25
End: 2025-06-19
Payer: COMMERCIAL

## 2025-06-19 DIAGNOSIS — L30.9 ECZEMA, UNSPECIFIED TYPE: Primary | ICD-10-CM

## 2025-06-19 NOTE — PROGRESS NOTES
[facility] Note  Encounter Date: Jun 19, 2025  Office Visit     Reviewed patient's past medical history and pertinent chart review prior to patient's visit today.     Dermatology Problem List:  # Dermatitis, atopic vs allergic contact  - nbUVB, clobetasol 0.05% ointment, tacrolimus 0.1% ointment, hydrocortisone 2.5% ointment  - patch testing ordered 6/19/2025   - Past: triamcinolone 0.1% ointment    ____________________________________________    Assessment & Plan:     # Dermatitis, atopic vs allergic contact   - Chronic, not at treatment goals.   - Patch testing ordered.   - Discussed risks and benefits of phototherapy versus Dupixent.  The patient elected to start phototherapy.  If her skin demonstrates improvement after 3 months we will plan to apply for a home light unit.  - Continue clobetasol ointment twice daily for 2 weeks as needed for flares on the body, tacrolimus ointment for maintenance.     Follow-up 3 months.    All risks, benefits and alternatives were discussed with patient.  Patient is in agreement and understands the assessment and plan.  All questions were answered.  Kristina Howell PA-C  [facility]  _______________________________________    CC: Eczema (Still having flares on arms to shoulders and back. Hands as well. )    HPI:  Ms. Sandi Huston is a(n) 25 year old female who presents today as a return patient for eczema.  She reports overall her eczema has slightly improved, however, she notes continued new flares on the trunk, face, and arms.  She is diligent with applying topicals and Vaseline. Patient is otherwise feeling well, without additional skin concerns.      Physical Exam:  SKIN: Focused examination of face, back, chest, abdomen, and arms was performed.  The bilateral upper arms, upper back, and right flank demonstrate pink patches with fine scale.  Postinflammatory hyperpigmentation involving the antecubital fossa bilaterally.    -  No other lesions of concern on areas examined.     Medications:  Current Outpatient Medications   Medication Sig Dispense Refill    clobetasol (TEMOVATE) 0.05 % external ointment Apply topically 2 times daily. Apply for 2 weeks Not for face, armpits or groin. 45 g 3    hydrocortisone 2.5 % ointment Apply topically 2 times daily. To face for flares for 14 days 30 g 1    hydrocortisone 2.5 % ointment Apply topically 2 times daily To  facial rash rash. Stop when rash/itch resolves. 40 g 1    tacrolimus (PROTOPIC) 0.1 % external ointment Apply topically 2 times daily as needed (for flares). To face 100 g 11    tacrolimus (PROTOPIC) 0.1 % external ointment Apply topically 2 times daily To rashes as needed 60 g 1    triamcinolone (KENALOG) 0.1 % external cream Apply topically 2 times daily To rash on hands/arms when flared, up to 1 month 80 g 1    triamcinolone (KENALOG) 0.1 % external ointment Apply topically 2 times daily. To rash on hands or body 80 g 1     No current facility-administered medications for this visit.      Past Medical History:   Patient Active Problem List   Diagnosis    Eczema    Irregular menses    Allergic reaction to fish    Anxiety     Past Medical History:   Diagnosis Date    Encounter for counseling for travel 04/17/2023       CC Referred Self, MD  No address on file on close of this encounter.

## 2025-06-19 NOTE — PATIENT INSTRUCTIONS
Proper skin care from [facility]:    -Eliminate harsh soaps as they strip the natural oils from the skin, often resulting in dry itchy skin ( i.e. Dial, Zest, [place])  -Use mild soaps such as Cetaphil or Dove Sensitive Skin in the shower. You do not need to use soap on arms, legs, and trunk every time you shower unless visibly soiled.   -Avoid hot or cold showers.  -After showering, lightly dry off and apply moisturizing within 2-3 minutes. This will help trap moisture in the skin.   -Aggressive use of a moisturizer at least 1-2 times a day to the entire body (including -Vanicream, Cetaphil, Aquaphor or Cerave) and moisturize hands after every washing.  -We recommend using moisturizers that come in a tub that needs to be scooped out, not a pump. This has more of an oil base. It will hold moisture in your skin much better than a water base moisturizer. The above recommended are non-pore clogging.      Wear a sunscreen with at least SPF 30 on your face, ears, neck and V of the chest daily. Wear sunscreen on other areas of the body if those areas are exposed to the sun throughout the day. Sunscreens can contain physical and/or chemical blockers. Physical blockers are less likely to clog pores, these include zinc oxide and titanium dioxide. Reapply every two hour and after swimming.     Sunscreen examples: https://www.ewg.org/sunscreen/    UV radiation  UVA radiation remains constant throughout the day and throughout the year. It is a longer wavelength than UVB and therefore penetrates deeper into the skin leading to immediate and delayed tanning, photoaging, and skin cancer. 70-80% of UVA and UVB radiation occurs between the hours of 10am-2pm.  UVB radiation  UVB radiation causes the most harmful effects and is more significant during the summer months. However, snow and ice can reflect UVB radiation leading to skin damage during the winter months as well. UVB radiation is responsible for tanning,  burning, inflammation, delayed erythema (pinkness), pigmentation (brown spots), and skin cancer.     I recommend self monthly full body exams and yearly full body exams with a dermatology provider. If you develop a new or changing lesion please follow up for examination. Most skin cancers are pink and scaly or pink and pearly. However, we do see blue/brown/black skin cancers.  Consider the ABCDEs of melanoma when giving yourself your monthly full body exam ( don't forget the groin, buttocks, feet, toes, etc). A-asymmetry, B-borders, C-color, D-diameter, E-elevation or evolving. If you see any of these changes please follow up in clinic. If you cannot see your back I recommend purchasing a hand held mirror to use with a larger wall mirror.       Checking for Skin Cancer  You can find cancer early by checking your skin each month. There are 3 kinds of skin cancer. They are melanoma, basal cell carcinoma, and squamous cell carcinoma. Doing monthly skin checks is the best way to find new marks or skin changes. Follow the instructions below for checking your skin.   The ABCDEs of checking moles for melanoma   Check your moles or growths for signs of melanoma using ABCDE:   Asymmetry: the sides of the mole or growth don t match  Border: the edges are ragged, notched, or blurred  Color: the color within the mole or growth varies  Diameter: the mole or growth is larger than 6 mm (size of a pencil eraser)  Evolving: the size, shape, or color of the mole or growth is changing (evolving is not shown in the images below)    Checking for other types of skin cancer  Basal cell carcinoma or squamous cell carcinoma have symptoms such as:     A spot or mole that looks different from all other marks on your skin  Changes in how an area feels, such as itching, tenderness, or pain  Changes in the skin's surface, such as oozing, bleeding, or scaliness  A sore that does not heal  New swelling or redness beyond the border of a  mole    Who s at risk?  Anyone can get skin cancer. But you are at greater risk if you have:   Fair skin, light-colored hair, or light-colored eyes  Many moles or abnormal moles on your skin  A history of sunburns from sunlight or tanning beds  A family history of skin cancer  A history of exposure to radiation or chemicals  A weakened immune system  If you have had skin cancer in the past, you are at risk for recurring skin cancer.   How to check your skin  Do your monthly skin checkups in front of a full-length mirror. Check all parts of your body, including your:   Head (ears, face, neck, and scalp)  Torso (front, back, and sides)  Arms (tops, undersides, upper, and lower armpits)  Hands (palms, backs, and fingers, including under the nails)  Buttocks and genitals  Legs (front, back, and sides)  Feet (tops, soles, toes, including under the nails, and between toes)  If you have a lot of moles, take digital photos of them each month. Make sure to take photos both up close and from a distance. These can help you see if any moles change over time.   Most skin changes are not cancer. But if you see any changes in your skin, call your doctor right away. Only he or she can diagnose a problem. If you have skin cancer, seeing your doctor can be the first step toward getting the treatment that could save your life.   [employer] last reviewed this educational content on 4/1/2019 2000-2020 [employer]. [address] [place] [address], [place] [address]. All rights reserved. This information is not intended as a substitute for professional medical care. Always follow your healthcare professional's instructions.       When should I call my doctor?  If you are worsening or not improving, please, contact us or seek urgent care as noted below.     Who should I call with questions (adults)?    [facility] [phone]  For urgent needs outside of business hours call the [facility] at  [phone] and ask for the dermatology resident on call to be paged  If this is a medical emergency and you are unable to reach an ER, Call [phone]      If you need a prescription refill, please contact your pharmacy. Refills are approved or denied by our Physicians during normal business hours, Monday through Friday.  Per office policy, refills will not be granted if you have not been seen within the past year (or sooner depending on the condition).

## 2025-06-19 NOTE — LETTER
6/19/2025      Sandi Huston  [address] [facility] [address]      Dear Colleague,    Thank you for referring your patient, Sandi Huston, to the [facility]. Please see a copy of my visit note below.    [facility] Note  Encounter Date: Jun 19, 2025  Office Visit     Reviewed patient's past medical history and pertinent chart review prior to patient's visit today.     Dermatology Problem List:  # Dermatitis, atopic vs allergic contact  - nbUVB, clobetasol 0.05% ointment, tacrolimus 0.1% ointment, hydrocortisone 2.5% ointment  - patch testing ordered 6/19/2025   - Past: triamcinolone 0.1% ointment    ____________________________________________    Assessment & Plan:     # Dermatitis, atopic vs allergic contact   - Chronic, not at treatment goals.   - Patch testing ordered.   - Discussed risks and benefits of phototherapy versus Dupixent.  The patient elected to start phototherapy.  If her skin demonstrates improvement after 3 months we will plan to apply for a home light unit.  - Continue clobetasol ointment twice daily for 2 weeks as needed for flares on the body, tacrolimus ointment for maintenance.     Follow-up 3 months.    All risks, benefits and alternatives were discussed with patient.  Patient is in agreement and understands the assessment and plan.  All questions were answered.  Kristina Howell PA-C  [facility]  _______________________________________    CC: Eczema (Still having flares on arms to shoulders and back. Hands as well. )    HPI:  Ms. Sandi Huston is a(n) 25 year old female who presents today as a return patient for eczema.  She reports overall her eczema has slightly improved, however, she notes continued new flares on the trunk, face, and arms.  She is diligent with applying topicals and Vaseline. Patient is otherwise feeling well, without additional skin concerns.      Physical Exam:  SKIN: Focused examination of  face, back, chest, abdomen, and arms was performed.  The bilateral upper arms, upper back, and right flank demonstrate pink patches with fine scale.  Postinflammatory hyperpigmentation involving the antecubital fossa bilaterally.    - No other lesions of concern on areas examined.     Medications:  Current Outpatient Medications   Medication Sig Dispense Refill     clobetasol (TEMOVATE) 0.05 % external ointment Apply topically 2 times daily. Apply for 2 weeks Not for face, armpits or groin. 45 g 3     hydrocortisone 2.5 % ointment Apply topically 2 times daily. To face for flares for 14 days 30 g 1     hydrocortisone 2.5 % ointment Apply topically 2 times daily To  facial rash rash. Stop when rash/itch resolves. 40 g 1     tacrolimus (PROTOPIC) 0.1 % external ointment Apply topically 2 times daily as needed (for flares). To face 100 g 11     tacrolimus (PROTOPIC) 0.1 % external ointment Apply topically 2 times daily To rashes as needed 60 g 1     triamcinolone (KENALOG) 0.1 % external cream Apply topically 2 times daily To rash on hands/arms when flared, up to 1 month 80 g 1     triamcinolone (KENALOG) 0.1 % external ointment Apply topically 2 times daily. To rash on hands or body 80 g 1     No current facility-administered medications for this visit.      Past Medical History:   Patient Active Problem List   Diagnosis     Eczema     Irregular menses     Allergic reaction to fish     Anxiety     Past Medical History:   Diagnosis Date     Encounter for counseling for travel 04/17/2023       CC Referred Self, MD  No address on file on close of this encounter.     Again, thank you for allowing me to participate in the care of your patient.        Sincerely,        Kristina Howell PA-C    Electronically signed

## 2025-06-23 ENCOUNTER — PATIENT OUTREACH (OUTPATIENT)
Dept: CARE COORDINATION | Facility: CLINIC | Age: 25
End: 2025-06-23
Payer: COMMERCIAL

## 2025-06-24 ENCOUNTER — TELEPHONE (OUTPATIENT)
Dept: FAMILY MEDICINE | Facility: CLINIC | Age: 25
End: 2025-06-24
Payer: COMMERCIAL

## 2025-06-24 NOTE — TELEPHONE ENCOUNTER
Contacts       Contact Date/Time Type Contact Phone/Fax    06/24/2025 09:05 AM CDT Phone (Outgoing) Sandi Hustonia (Self) [phone] (H)    Talked with Patient           Attempted to reach patient to: Relay a message    Regarding: called for appointment reminder, task completed.

## 2025-06-25 SDOH — HEALTH STABILITY: PHYSICAL HEALTH: ON AVERAGE, HOW MANY MINUTES DO YOU ENGAGE IN EXERCISE AT THIS LEVEL?: 30 MIN

## 2025-06-25 SDOH — HEALTH STABILITY: PHYSICAL HEALTH: ON AVERAGE, HOW MANY DAYS PER WEEK DO YOU ENGAGE IN MODERATE TO STRENUOUS EXERCISE (LIKE A BRISK WALK)?: 2 DAYS

## 2025-06-25 ASSESSMENT — SOCIAL DETERMINANTS OF HEALTH (SDOH): HOW OFTEN DO YOU GET TOGETHER WITH FRIENDS OR RELATIVES?: TWICE A WEEK

## 2025-06-26 ENCOUNTER — RESULTS FOLLOW-UP (OUTPATIENT)
Dept: FAMILY MEDICINE | Facility: CLINIC | Age: 25
End: 2025-06-26

## 2025-06-26 ENCOUNTER — OFFICE VISIT (OUTPATIENT)
Dept: FAMILY MEDICINE | Facility: CLINIC | Age: 25
End: 2025-06-26
Payer: COMMERCIAL

## 2025-06-26 VITALS
SYSTOLIC BLOOD PRESSURE: 104 MMHG | HEIGHT: 60 IN | RESPIRATION RATE: 16 BRPM | HEART RATE: 68 BPM | TEMPERATURE: 96.8 F | OXYGEN SATURATION: 99 % | DIASTOLIC BLOOD PRESSURE: 76 MMHG | WEIGHT: 155 LBS | BODY MASS INDEX: 30.43 KG/M2

## 2025-06-26 DIAGNOSIS — Z00.00 ANNUAL PHYSICAL EXAM: Primary | ICD-10-CM

## 2025-06-26 DIAGNOSIS — N92.6 IRREGULAR MENSES: ICD-10-CM

## 2025-06-26 DIAGNOSIS — L30.9 ECZEMA, UNSPECIFIED TYPE: ICD-10-CM

## 2025-06-26 DIAGNOSIS — R10.12 LUQ ABDOMINAL PAIN: ICD-10-CM

## 2025-06-26 LAB
ALBUMIN SERPL BCG-MCNC: 4 G/DL (ref 3.5–5.2)
ALP SERPL-CCNC: 70 U/L (ref 40–150)
ALT SERPL W P-5'-P-CCNC: 22 U/L (ref 0–50)
ANION GAP SERPL CALCULATED.3IONS-SCNC: 9 MMOL/L (ref 7–15)
AST SERPL W P-5'-P-CCNC: 28 U/L (ref 0–45)
BILIRUB SERPL-MCNC: 0.3 MG/DL
BUN SERPL-MCNC: 10.7 MG/DL (ref 6–20)
CALCIUM SERPL-MCNC: 9 MG/DL (ref 8.8–10.4)
CHLORIDE SERPL-SCNC: 104 MMOL/L (ref 98–107)
CHOLEST SERPL-MCNC: 203 MG/DL
CREAT SERPL-MCNC: 0.6 MG/DL (ref 0.51–0.95)
EGFRCR SERPLBLD CKD-EPI 2021: >90 ML/MIN/1.73M2
ERYTHROCYTE [DISTWIDTH] IN BLOOD BY AUTOMATED COUNT: 11.9 % (ref 10–15)
EST. AVERAGE GLUCOSE BLD GHB EST-MCNC: 114 MG/DL
FASTING STATUS PATIENT QL REPORTED: NO
FASTING STATUS PATIENT QL REPORTED: NO
GLUCOSE SERPL-MCNC: 85 MG/DL (ref 70–99)
HBA1C MFR BLD: 5.6 % (ref 0–5.6)
HCO3 SERPL-SCNC: 24 MMOL/L (ref 22–29)
HCT VFR BLD AUTO: 39 % (ref 35–47)
HDLC SERPL-MCNC: 40 MG/DL
HGB BLD-MCNC: 12.7 G/DL (ref 11.7–15.7)
LDLC SERPL CALC-MCNC: 139 MG/DL
LIPASE SERPL-CCNC: 42 U/L (ref 13–60)
MCH RBC QN AUTO: 29.1 PG (ref 26.5–33)
MCHC RBC AUTO-ENTMCNC: 32.6 G/DL (ref 31.5–36.5)
MCV RBC AUTO: 89 FL (ref 78–100)
NONHDLC SERPL-MCNC: 163 MG/DL
PLATELET # BLD AUTO: 348 10E3/UL (ref 150–450)
POTASSIUM SERPL-SCNC: 4 MMOL/L (ref 3.4–5.3)
PROT SERPL-MCNC: 7.8 G/DL (ref 6.4–8.3)
RBC # BLD AUTO: 4.36 10E6/UL (ref 3.8–5.2)
SODIUM SERPL-SCNC: 137 MMOL/L (ref 135–145)
TRIGL SERPL-MCNC: 121 MG/DL
WBC # BLD AUTO: 7.3 10E3/UL (ref 4–11)

## 2025-06-26 NOTE — PROGRESS NOTES
Prior to immunization administration, verified patients identity using patient s name and date of birth. Please see Immunization Activity for additional information.     Screening Questionnaire for Adult Immunization    Are you sick today?   No   Do you have allergies to medications, food, a vaccine component or latex?   Yes   Have you ever had a serious reaction after receiving a vaccination?   No   Do you have a long-term health problem with heart, lung, kidney, or metabolic disease (e.g., diabetes), asthma, a blood disorder, no spleen, complement component deficiency, a cochlear implant, or a spinal fluid leak?  Are you on long-term aspirin therapy?   No   Do you have cancer, leukemia, HIV/AIDS, or any other immune system problem?   No   Do you have a parent, brother, or sister with an immune system problem?   No   In the past 3 months, have you taken medications that affect  your immune system, such as prednisone, other steroids, or anticancer drugs; drugs for the treatment of rheumatoid arthritis, Crohn s disease, or psoriasis; or have you had radiation treatments?   No   Have you had a seizure, or a brain or other nervous system problem?   No   During the past year, have you received a transfusion of blood or blood    products, or been given immune (gamma) globulin or antiviral drug?   No   For women: Are you pregnant or is there a chance you could become       pregnant during the next month?   No   Have you received any vaccinations in the past 4 weeks?   No     Immunization questionnaire was positive for at least one answer.  Efra Rome.      Patient instructed to remain in clinic for 15 minutes afterwards, and to report any adverse reactions.     Screening performed by Fernando Flynn MA on 6/26/2025 at 7:38 AM.

## 2025-06-26 NOTE — PATIENT INSTRUCTIONS
This is a website that has a lot of helpful information about birth control options:  https://[email or web address].org/learn/birth-control          Patient Education   Preventive Care Advice   This is general advice given by our system to help you stay healthy. However, your care team may have specific advice just for you. Please talk to your care team about your preventive care needs.  Nutrition  Eat 5 or more servings of fruits and vegetables each day.  Try wheat bread, brown rice and whole grain pasta (instead of white bread, rice, and pasta).  Get enough calcium and vitamin D. Check the label on foods and aim for 100% of the RDA (recommended daily allowance).  Lifestyle  Exercise at least 150 minutes each week  (30 minutes a day, 5 days a week).  Do muscle strengthening activities 2 days a week. These help control your weight and prevent disease.  No smoking.  Wear sunscreen to prevent skin cancer.  Have a dental exam and cleaning every 6 months.  Yearly exams  See your health care team every year to talk about:  Any changes in your health.  Any medicines your care team has prescribed.  Preventive care, family planning, and ways to prevent chronic diseases.  Shots (vaccines)   HPV shots (up to age 26), if you've never had them before.  Hepatitis B shots (up to age 59), if you've never had them before.  COVID-19 shot: Get this shot when it's due.  Flu shot: Get a flu shot every year.  Tetanus shot: Get a tetanus shot every 10 years.  Pneumococcal, hepatitis A, and RSV shots: Ask your care team if you need these based on your risk.  Shingles shot (for age 50 and up)  General health tests  Diabetes screening:  Starting at age 35, Get screened for diabetes at least every 3 years.  If you are younger than age 35, ask your care team if you should be screened for diabetes.  Cholesterol test: At age 39, start having a cholesterol test every 5 years, or more often if advised.  Bone density scan (DEXA): At age 50, ask  your care team if you should have this scan for osteoporosis (brittle bones).  Hepatitis C: Get tested at least once in your life.  STIs (sexually transmitted infections)  Before age 24: Ask your care team if you should be screened for STIs.  After age 24: Get screened for STIs if you're at risk. You are at risk for STIs (including HIV) if:  You are sexually active with more than one person.  You don't use condoms every time.  You or a partner was diagnosed with a sexually transmitted infection.  If you are at risk for HIV, ask about PrEP medicine to prevent HIV.  Get tested for HIV at least once in your life, whether you are at risk for HIV or not.  Cancer screening tests  Cervical cancer screening: If you have a cervix, begin getting regular cervical cancer screening tests starting at age 21.  Breast cancer scan (mammogram): If you've ever had breasts, begin having regular mammograms starting at age 40. This is a scan to check for breast cancer.  Colon cancer screening: It is important to start screening for colon cancer at age 45.  Have a colonoscopy test every 10 years (or more often if you're at risk) Or, ask your provider about stool tests like a FIT test every year or Cologuard test every 3 years.  To learn more about your testing options, visit:   .  For help making a decision, visit:   https://bit.ly/je85127.  Prostate cancer screening test: If you have a prostate, ask your care team if a prostate cancer screening test (PSA) at age 55 is right for you.  Lung cancer screening: If you are a current or former smoker ages 50 to 80, ask your care team if ongoing lung cancer screenings are right for you.  For informational purposes only. Not to replace the advice of your health care provider. Copyright   2023 [employer]. All rights reserved. Clinically reviewed by the [facility]. 3Nod [id number] - REV 01/24.

## 2025-06-26 NOTE — PROGRESS NOTES
"SUBJECTIVE    Pah Phuong Huston is a 25 year old female who presents for an annual exam.    Other concerns today:  Bubbles in her chest  She says she has had symptoms, looked them up on the Internet, and it said she had \"bubbles in her chest.\"  With further discussion, she has been having intermittent pain or fullness in the upper quadrant.  She says that used to happen 2-3 times a year, lasted overnight and then went away.  Last week she had it happen several times.  This week is slightly better, but overall still more frequent than it has been before.  No symptoms today.  She cannot think of any triggers, injuries, or other causes for this.  Pain does not seem to be triggered by any specific thing.    2.  Irregular periods  She has had irregular periods for a while.  We first discussed this in October 2024.  At that time she wanted to monitor her symptoms.  Today, she notes she is getting about 1 period every 5 months.  She thinks it is due to weight gain.    Patient Active Problem List    Diagnosis Date Noted    Anxiety 10/19/2024     Priority: Medium    Allergic reaction to fish 04/17/2023     Priority: Medium    Eczema 07/08/2019     Priority: Medium    Irregular menses 07/08/2019     Priority: Medium        Immunization History   Administered Date(s) Administered    COVID-19 Bivalent 12+ (Pfizer) 06/28/2023    COVID-19 MONOVALENT 12+ (Pfizer) 03/16/2021, 04/06/2021    COVID-19 Monovalent 12+ (Pfizer 2022) 01/29/2022    DTaP, Unspecified 2001, 2001, 2001, 09/14/2004, 12/29/2006    Flu, Unspecified 10/28/2010, 09/22/2011, 10/30/2012, 12/19/2013, 11/04/2014, 10/20/2015    HIB, Unspecified 2001, 2001, 03/08/2002, 07/09/2002, 09/14/2004    HPV Quadrivalent 12/19/2013, 07/09/2014, 11/11/2015    HepA, Unspecified 12/19/2013, 07/09/2014    HepB, Unspecified 2001, 2001, 03/08/2002    Influenza (H1N1) 12/15/2009, 02/09/2010    Influenza (IIV3) PF 10/28/2010    Influenza Vaccine " (Flucelvax Quadrivalent) 09/14/2024    Influenza Vaccine >6 months,quad, PF 11/15/2017, 10/20/2021    Influenza Vaccine, 6+MO IM (QUADRIVALENT W/PRESERVATIVES) 09/30/2018, 10/18/2019, 10/24/2020, 10/23/2022    MMR (MMRII) 07/09/2002, 09/21/2004    Mantoux Tuberculin Skin Test 04/17/2023    Meningococcal ACWY (Menactra ) 11/15/2017    Meningococcal Mcv4 Conjugate,unspecified  12/19/2013    Pneumo Conj 13-V (2010&after) 2001, 2001    Pneumococcal (PCV 7) 2001, 2001, 2001    Poliovirus, inactivated (IPV) 2001, 2001, 2001, 12/29/2006    TDAP (Adacel,Boostrix) 01/07/2013, 04/07/2013, 04/17/2023    Typhoid IM 11/14/2019    Varicella (Varivax) 09/21/2004, 12/29/2006       No LMP recorded (lmp unknown). (Menstrual status: Irregular Periods).  OB History   No obstetric history on file.       Current Outpatient Medications   Medication Sig Dispense Refill    clobetasol (TEMOVATE) 0.05 % external ointment Apply topically 2 times daily. Apply for 2 weeks Not for face, armpits or groin. 45 g 3    hydrocortisone 2.5 % ointment Apply topically 2 times daily. To face for flares for 14 days 30 g 1    tacrolimus (PROTOPIC) 0.1 % external ointment Apply topically 2 times daily as needed (for flares). To face 100 g 11    triamcinolone (KENALOG) 0.1 % external ointment Apply topically 2 times daily. To rash on hands or body 80 g 1     No current facility-administered medications for this visit.       Past Medical History:   Diagnosis Date    Encounter for counseling for travel 04/17/2023       No past surgical history on file.     Family History   Problem Relation Age of Onset    No Known Problems Mother     No Known Problems Father     Congenital heart disease Sister     No Known Problems Brother     No Known Problems Brother     No Known Problems Brother     No Known Problems Brother     No Known Problems Brother           Advance Care Planning    Discussed advance care planning with  patient; informed AVS has link to [facility].        6/25/2025   General Health   How would you rate your overall physical health? (!) POOR   Feel stress (tense, anxious, or unable to sleep) Only a little   (!) STRESS CONCERN      6/25/2025   Nutrition   Three or more servings of calcium each day? (!) NO   Diet: I don't know   How many servings of fruit and vegetables per day? (!) 2-3   How many sweetened beverages each day? 0-1         6/25/2025   Exercise   Days per week of moderate/strenous exercise 2 days   Average minutes spent exercising at this level 30 min   (!) EXERCISE CONCERN      6/25/2025   Social Factors   Frequency of gathering with friends or relatives Twice a week   Worry food won't last until get money to buy more No   Food not last or not have enough money for food? No   Do you have housing? (Housing is defined as stable permanent housing and does not include staying outside in a car, in a tent, in an abandoned building, in an overnight shelter, or couch-surfing.) Yes   Are you worried about losing your housing? No   Lack of transportation? No   Unable to get utilities (heat,electricity)? No         6/25/2025   Dental   Dentist two times every year? Yes           Today's PHQ-2 Score:       3/27/2025     8:11 AM   PHQ-2 ( 1999 Pfizer)   Q1: Little interest or pleasure in doing things 0   Q2: Feeling down, depressed or hopeless 0   PHQ-2 Score 0         6/25/2025   Substance Use   Alcohol more than 3/day or more than 7/wk No   Do you use any other substances recreationally? No     Social History     Tobacco Use    Smoking status: Never     Passive exposure: Never    Smokeless tobacco: Never    Tobacco comments:     no secondhand smoke   Vaping Use    Vaping status: Never Used   Substance Use Topics    Alcohol use: Never    Drug use: Never           6/25/2025   STI Screening   New sexual partner(s) since last STI/HIV test? No         4/17/2023    10:44 AM   PAP / HPV   PAP Negative for  Intraepithelial Lesion or Malignancy (NILM)            6/25/2025   Contraception/Family Planning   Questions about contraception or family planning No   What are your periods like? (!) IRREGULAR       Reviewed and updated as needed this visit by Provider                  OBJECTIVE    Vitals:    06/26/25 0739   BP: 104/76   Pulse: 68   Resp: 16   Temp: 96.8  F (36  C)   TempSrc: Temporal   SpO2: 99%   Weight: 70.3 kg (155 lb)   Height: 1.524 m (5')       Body mass index is 30.27 kg/m .    Physical Exam:  General: patient is alert and oriented x 3, in no apparent distress, appropriately groomed with normal affect  HEENT, Thyroid, Lymphatic, Cardiac, Pulmonary, Musculoskeletal, Skin, and Neuro exams were completed today and grossly normal  Abdomen: Main area of pain is the left upper quadrant.  No pain with direct palpation there today.  No pain triggered there with movement of the torso or arms, otherwise abdominal exam is completely normal  Breast exam: deferred  Genitourinary: deferred      Today's labs pending      ASSESSMENT and PLAN  1. Annual physical exam (Primary)  Health maintenance is discussed with patient as appropriate for age and risk factors.  She declines offer of birth control.  I provided her with a link to [facility] website where birth control options are discussed.  I reviewed all forms of birth control with her today during visit.  She declines STD screening.  Screening labs ordered and I will follow-up with results.  She declined COVID booster shot.  - Lipid Profile  - Hemoglobin A1c    2. LUQ abdominal pain  New concern, worsening.  Differential includes nonspecific abdominal pain, musculoskeletal chest wall pain, GERD, H. pylori, versus other.  Exam was completely normal today.  She does not have an acute abdomen.  Symptoms have been chronic and have improved over the past several days.  Screening labs ordered as below and I will follow-up with results.  Also discussed possibility for  H. pylori and we will test for that as well.  If all labs are normal, and symptoms do not improve, could consider PT for possible musculoskeletal origin.  - CBC with platelets  - Comprehensive metabolic panel  - Lipase; Future  - Helicobacter pylori Antigen Stool; Future  - Lipase  - Helicobacter pylori Antigen Stool    3. Irregular menses  Chronic, stable.  We have discussed this in the past.  She wants to just continue to monitor symptoms and also attempt to lose weight.  Follow-up as needed.    4. Eczema, unspecified type  Chronic, stable.  Following with dermatology.  Using creams as directed.      The longitudinal plan of care for the diagnosis(es)/condition(s) as documented were addressed during this visit. Due to the added complexity in care, I will continue to support [facility] in the subsequent management and with ongoing continuity of care.          This dictation uses voice recognition software, which may contain typographical errors.

## 2025-08-25 ENCOUNTER — OFFICE VISIT (OUTPATIENT)
Dept: ALLERGY | Facility: CLINIC | Age: 25
End: 2025-08-25
Attending: PHYSICIAN ASSISTANT
Payer: COMMERCIAL

## 2025-08-25 VITALS
RESPIRATION RATE: 20 BRPM | OXYGEN SATURATION: 98 % | BODY MASS INDEX: 28.71 KG/M2 | WEIGHT: 147 LBS | HEART RATE: 74 BPM

## 2025-08-25 DIAGNOSIS — L30.9 ECZEMA, UNSPECIFIED TYPE: ICD-10-CM

## 2025-08-25 DIAGNOSIS — J30.1 SEASONAL ALLERGIC RHINITIS DUE TO POLLEN: Primary | ICD-10-CM

## 2025-08-25 DIAGNOSIS — H10.13 ALLERGIC CONJUNCTIVITIS, BILATERAL: ICD-10-CM

## 2025-08-25 DIAGNOSIS — J30.81 ALLERGIC RHINITIS DUE TO ANIMALS: ICD-10-CM

## 2025-08-25 DIAGNOSIS — J30.89 ALLERGIC RHINITIS DUE TO DUST MITE: ICD-10-CM

## 2025-08-25 PROCEDURE — 99204 OFFICE O/P NEW MOD 45 MIN: CPT | Mod: 25

## 2025-08-25 PROCEDURE — 95004 PERQ TESTS W/ALRGNC XTRCS: CPT

## 2025-08-25 RX ORDER — AZELASTINE 1 MG/ML
1-2 SPRAY, METERED NASAL PRN
Qty: 30 ML | Refills: 3 | Status: SHIPPED | OUTPATIENT
Start: 2025-08-25

## 2025-08-25 RX ORDER — CETIRIZINE HYDROCHLORIDE 10 MG/1
10 TABLET ORAL DAILY
Qty: 90 TABLET | Refills: 3 | Status: SHIPPED | OUTPATIENT
Start: 2025-08-25

## 2025-08-25 RX ORDER — OLOPATADINE HYDROCHLORIDE 1 MG/ML
1 SOLUTION OPHTHALMIC 2 TIMES DAILY
Qty: 5 ML | Refills: 3 | Status: SHIPPED | OUTPATIENT
Start: 2025-08-25